# Patient Record
Sex: FEMALE | Race: WHITE | Employment: FULL TIME | URBAN - METROPOLITAN AREA
[De-identification: names, ages, dates, MRNs, and addresses within clinical notes are randomized per-mention and may not be internally consistent; named-entity substitution may affect disease eponyms.]

---

## 2017-07-06 ENCOUNTER — OFFICE VISIT (OUTPATIENT)
Dept: FAMILY MEDICINE CLINIC | Age: 47
End: 2017-07-06

## 2017-07-06 VITALS
SYSTOLIC BLOOD PRESSURE: 114 MMHG | HEART RATE: 84 BPM | DIASTOLIC BLOOD PRESSURE: 70 MMHG | RESPIRATION RATE: 15 BRPM | TEMPERATURE: 97 F | HEIGHT: 66 IN

## 2017-07-06 DIAGNOSIS — S99.912A ANKLE INJURY, LEFT, INITIAL ENCOUNTER: Primary | ICD-10-CM

## 2017-07-06 DIAGNOSIS — S93.412A SPRAIN OF CALCANEOFIBULAR LIGAMENT OF LEFT ANKLE, INITIAL ENCOUNTER: ICD-10-CM

## 2017-07-06 PROCEDURE — G8421 BMI NOT CALCULATED: HCPCS | Performed by: NURSE PRACTITIONER

## 2017-07-06 PROCEDURE — 99213 OFFICE O/P EST LOW 20 MIN: CPT | Performed by: NURSE PRACTITIONER

## 2017-07-06 PROCEDURE — 1036F TOBACCO NON-USER: CPT | Performed by: NURSE PRACTITIONER

## 2017-07-06 PROCEDURE — G8427 DOCREV CUR MEDS BY ELIG CLIN: HCPCS | Performed by: NURSE PRACTITIONER

## 2017-07-06 RX ORDER — IBUPROFEN AND FAMOTIDINE 26.6; 8 MG/1; MG/1
TABLET, FILM COATED ORAL
Qty: 90 TABLET | Refills: 5 | Status: SHIPPED | OUTPATIENT
Start: 2017-07-06 | End: 2017-08-07 | Stop reason: ALTCHOICE

## 2017-08-07 ENCOUNTER — OFFICE VISIT (OUTPATIENT)
Dept: FAMILY MEDICINE CLINIC | Age: 47
End: 2017-08-07

## 2017-08-07 VITALS
HEART RATE: 72 BPM | TEMPERATURE: 97.3 F | HEIGHT: 66 IN | SYSTOLIC BLOOD PRESSURE: 114 MMHG | BODY MASS INDEX: 27.32 KG/M2 | RESPIRATION RATE: 16 BRPM | WEIGHT: 170 LBS | DIASTOLIC BLOOD PRESSURE: 66 MMHG

## 2017-08-07 DIAGNOSIS — R63.5 WEIGHT GAIN: ICD-10-CM

## 2017-08-07 DIAGNOSIS — S93.432D SPRAIN OF TIBIOFIBULAR LIGAMENT OF LEFT ANKLE, SUBSEQUENT ENCOUNTER: ICD-10-CM

## 2017-08-07 DIAGNOSIS — Z12.31 VISIT FOR SCREENING MAMMOGRAM: ICD-10-CM

## 2017-08-07 DIAGNOSIS — M25.572 ACUTE LEFT ANKLE PAIN: ICD-10-CM

## 2017-08-07 DIAGNOSIS — M25.572 ACUTE LEFT ANKLE PAIN: Primary | ICD-10-CM

## 2017-08-07 LAB
ALBUMIN SERPL-MCNC: 4.3 G/DL (ref 3.9–4.9)
ALP BLD-CCNC: 51 U/L (ref 40–130)
ALT SERPL-CCNC: 9 U/L (ref 0–33)
ANION GAP SERPL CALCULATED.3IONS-SCNC: 16 MEQ/L (ref 7–13)
AST SERPL-CCNC: 14 U/L (ref 0–35)
BILIRUB SERPL-MCNC: 0.8 MG/DL (ref 0–1.2)
BUN BLDV-MCNC: 9 MG/DL (ref 6–20)
CALCIUM SERPL-MCNC: 9.2 MG/DL (ref 8.6–10.2)
CHLORIDE BLD-SCNC: 106 MEQ/L (ref 98–107)
CHOLESTEROL, TOTAL: 199 MG/DL (ref 0–199)
CO2: 20 MEQ/L (ref 22–29)
CREAT SERPL-MCNC: 0.65 MG/DL (ref 0.5–0.9)
GFR AFRICAN AMERICAN: >60
GFR NON-AFRICAN AMERICAN: >60
GLOBULIN: 2.7 G/DL (ref 2.3–3.5)
GLUCOSE BLD-MCNC: 89 MG/DL (ref 74–109)
HCT VFR BLD CALC: 33.8 % (ref 37–47)
HDLC SERPL-MCNC: 104 MG/DL (ref 40–59)
HEMOGLOBIN: 10.7 G/DL (ref 12–16)
LDL CHOLESTEROL CALCULATED: 84 MG/DL (ref 0–129)
MCH RBC QN AUTO: 22.1 PG (ref 27–31.3)
MCHC RBC AUTO-ENTMCNC: 31.6 % (ref 33–37)
MCV RBC AUTO: 70 FL (ref 82–100)
PDW BLD-RTO: 18.6 % (ref 11.5–14.5)
PLATELET # BLD: 318 K/UL (ref 130–400)
POTASSIUM SERPL-SCNC: 4.5 MEQ/L (ref 3.5–5.1)
RBC # BLD: 4.83 M/UL (ref 4.2–5.4)
SEDIMENTATION RATE, ERYTHROCYTE: 11 MM (ref 0–20)
SODIUM BLD-SCNC: 142 MEQ/L (ref 132–144)
T4 FREE: 0.91 NG/DL (ref 0.93–1.7)
TOTAL PROTEIN: 7 G/DL (ref 6.4–8.1)
TRIGL SERPL-MCNC: 53 MG/DL (ref 0–200)
TSH SERPL DL<=0.05 MIU/L-ACNC: 4.55 UIU/ML (ref 0.27–4.2)
URIC ACID, SERUM: 3.1 MG/DL (ref 2.4–5.7)
WBC # BLD: 6.5 K/UL (ref 4.8–10.8)

## 2017-08-07 PROCEDURE — G8419 CALC BMI OUT NRM PARAM NOF/U: HCPCS | Performed by: FAMILY MEDICINE

## 2017-08-07 PROCEDURE — 99214 OFFICE O/P EST MOD 30 MIN: CPT | Performed by: FAMILY MEDICINE

## 2017-08-07 PROCEDURE — 1036F TOBACCO NON-USER: CPT | Performed by: FAMILY MEDICINE

## 2017-08-07 PROCEDURE — G8427 DOCREV CUR MEDS BY ELIG CLIN: HCPCS | Performed by: FAMILY MEDICINE

## 2017-08-07 ASSESSMENT — PATIENT HEALTH QUESTIONNAIRE - PHQ9
1. LITTLE INTEREST OR PLEASURE IN DOING THINGS: 0
SUM OF ALL RESPONSES TO PHQ9 QUESTIONS 1 & 2: 0
2. FEELING DOWN, DEPRESSED OR HOPELESS: 0
SUM OF ALL RESPONSES TO PHQ QUESTIONS 1-9: 0

## 2017-08-08 RX ORDER — IRON POLYSACCHARIDE COMPLEX 150 MG
150 CAPSULE ORAL 2 TIMES DAILY
Qty: 60 CAPSULE | Refills: 3 | Status: SHIPPED | OUTPATIENT
Start: 2017-08-08

## 2017-08-08 RX ORDER — LEVOTHYROXINE SODIUM 0.03 MG/1
25 TABLET ORAL DAILY
Qty: 30 TABLET | Refills: 3 | Status: SHIPPED | OUTPATIENT
Start: 2017-08-08 | End: 2017-12-12 | Stop reason: SDUPTHER

## 2017-08-21 ENCOUNTER — HOSPITAL ENCOUNTER (OUTPATIENT)
Dept: WOMENS IMAGING | Age: 47
Discharge: HOME | End: 2017-08-21
Payer: COMMERCIAL

## 2017-08-21 DIAGNOSIS — Z12.31 VISIT FOR SCREENING MAMMOGRAM: ICD-10-CM

## 2017-08-21 PROCEDURE — G0202 SCR MAMMO BI INCL CAD: HCPCS

## 2017-09-13 ENCOUNTER — TELEPHONE (OUTPATIENT)
Dept: FAMILY MEDICINE CLINIC | Age: 47
End: 2017-09-13

## 2017-10-05 DIAGNOSIS — E03.9 HYPOTHYROIDISM, UNSPECIFIED TYPE: Primary | ICD-10-CM

## 2017-10-05 DIAGNOSIS — D64.9 ANEMIA, UNSPECIFIED TYPE: ICD-10-CM

## 2017-10-05 DIAGNOSIS — E03.9 HYPOTHYROIDISM, UNSPECIFIED TYPE: ICD-10-CM

## 2017-10-05 LAB
BASOPHILS ABSOLUTE: 0.1 K/UL (ref 0–0.2)
BASOPHILS RELATIVE PERCENT: 0.8 %
EOSINOPHILS ABSOLUTE: 0 K/UL (ref 0–0.7)
EOSINOPHILS RELATIVE PERCENT: 0.4 %
HCT VFR BLD CALC: 36.4 % (ref 37–47)
HEMOGLOBIN: 11.3 G/DL (ref 12–16)
LYMPHOCYTES ABSOLUTE: 2.4 K/UL (ref 1–4.8)
LYMPHOCYTES RELATIVE PERCENT: 28.7 %
MCH RBC QN AUTO: 23.3 PG (ref 27–31.3)
MCHC RBC AUTO-ENTMCNC: 31.1 % (ref 33–37)
MCV RBC AUTO: 75.1 FL (ref 82–100)
MONOCYTES ABSOLUTE: 0.6 K/UL (ref 0.2–0.8)
MONOCYTES RELATIVE PERCENT: 6.9 %
NEUTROPHILS ABSOLUTE: 5.3 K/UL (ref 1.4–6.5)
NEUTROPHILS RELATIVE PERCENT: 63.2 %
PDW BLD-RTO: 21.4 % (ref 11.5–14.5)
PLATELET # BLD: 300 K/UL (ref 130–400)
RBC # BLD: 4.85 M/UL (ref 4.2–5.4)
T4 FREE: 1 NG/DL (ref 0.93–1.7)
TSH SERPL DL<=0.05 MIU/L-ACNC: 1.99 UIU/ML (ref 0.27–4.2)
WBC # BLD: 8.3 K/UL (ref 4.8–10.8)

## 2017-10-06 ENCOUNTER — HOSPITAL ENCOUNTER (OUTPATIENT)
Dept: PHYSICAL THERAPY | Age: 47
Setting detail: THERAPIES SERIES
Discharge: HOME | End: 2017-10-06
Payer: COMMERCIAL

## 2017-10-06 DIAGNOSIS — D50.9 IRON DEFICIENCY ANEMIA, UNSPECIFIED IRON DEFICIENCY ANEMIA TYPE: Primary | ICD-10-CM

## 2017-10-06 PROCEDURE — 97110 THERAPEUTIC EXERCISES: CPT

## 2017-10-06 PROCEDURE — 97161 PT EVAL LOW COMPLEX 20 MIN: CPT

## 2017-10-06 ASSESSMENT — PAIN DESCRIPTION - FREQUENCY: FREQUENCY: INTERMITTENT

## 2017-10-06 ASSESSMENT — PAIN DESCRIPTION - LOCATION: LOCATION: KNEE

## 2017-10-06 ASSESSMENT — PAIN DESCRIPTION - ORIENTATION: ORIENTATION: LEFT

## 2017-10-06 ASSESSMENT — PAIN DESCRIPTION - DESCRIPTORS: DESCRIPTORS: STABBING;ACHING

## 2017-10-06 ASSESSMENT — PAIN DESCRIPTION - PAIN TYPE: TYPE: ACUTE PAIN

## 2017-10-06 ASSESSMENT — PAIN DESCRIPTION - PROGRESSION: CLINICAL_PROGRESSION: GRADUALLY WORSENING

## 2017-10-06 NOTE — PROGRESS NOTES
down bleachers, since then the pain in the lateral thigh has subsided however she conts to have lateral knee pain that sometimes radiates to the anterior knee; knee has buckled x2 when descending stairs; feels like there is \"an air bubble\" onto lateral knee sometimes when walking ; Pt also reports having a fall onto the L knee >/=20 years ago and having a L lateral ankle sprain in 2017.    Comments: RTD 2 weeks     Objective:   Balance  Single Leg Stance R Le  Single Leg Stance L Le  Comments: increased frontal plane mvmt/instability noted with L SLS     Ambulation 1  Surface: carpet  Device: No Device  Assistance: Independent  Quality of Gait: B medial whip   Distance: in dept     Strength RLE  Comment: 5/5 except hip ext 4/5, hip abd 4+/5   Strength LLE  Comment: 5/5 except hip flex 4+/5, hip abd 4/5, hip ext 4-/5     AROM RLE (degrees)  RLE AROM: WFL  RLE General AROM: knee flex 130, L ankle df with KE 8      AROM LLE (degrees)  LLE AROM : WFL  LLE General AROM: except knee flex 120, L ankle DF with KE 6, with KF 20  (pain at end range flexion )    Observation/Palpation  Posture: Good  Palpation: TTP mid L ITB  Observation: L calcneal valgus      Ambulation 1  Surface: carpet  Device: No Device  Assistance: Independent  Quality of Gait: B medial whip   Distance: in dept      Ambulation  Ambulation?: Yes    Additional Measures  Flexibility: (-) Obers, elys; tight L gastroc see above jorge; 90/90 HS L 32 R 15   Special Tests: (-) varus/valgus stress, anterior/posterior drawer, apleys compression (+) Mcmurrays, patellar compression  Other: able to perform deep squat without pain; increased crepitus with knee arom      Exercises:   Exercises  Exercise 1: HS stretch 30\"x3  (seated and at step)  Exercise 2: gastroc stretch with towel 30\"x3  (towel and at wall )  Exercise 3: S/L hip abd*  Exercise 4: bridges*  Exercise 5: supine hip ext into pball*  Exercise 6: glut med wall press*  Exercise 7: hip Rating (0-10 pain scale): 0/10  Location and pain description same as pre-treatment unless indicated. Action: [x] NA  [] Call Physician  [] Perform HEP  [] Meds as prescribed    Evaluation and patient rights have been reviewed and patient agrees with plan of care. Yes  [x]  No  []   Explain:     Radha Fall Risk Assessment  Risk Factor Scale  Score   History of Falls [] Yes  [] No 25  0 0   Secondary Diagnosis [] Yes  [] No 15  0 0   Ambulatory Aid [] Furniture  [] Crutches/cane/walker  [] None/bedrest/wheelchair/nurse 30  15  0 0   IV/Heparin Lock [] Yes  [] No 20  0 0   Gait/Transferring [] Impaired  [] Weak  [] Normal/bedrest/immobile 20  10  0 0   Mental Status [] Forgets limitations  [] Oriented to own ability 15  0 0       Total: 0     Based on the Assessment score: check the appropriate box. [x]  No intervention needed   Low =   Score of 0-24  []  Use standard prevention interventions Moderate =  Score of 24-44   [] Discuss fall prevention strategies   [] Indicate moderate falls risk on eval  []  Use high risk prevention interventions High = Score of 45 and higher   [] Discuss fall prevention strategies   [] Provide supervision during treatment time    Goals  Long term goals  Time Frame for Long term goals : 4-5 weeks   Long term goal 1: Pt will be indep/compliant with HEP in order to self manage symptoms upon D/C  Long term goal 2: The pt will demo imiproved B hip strength 4+ to 5/5 in order to ambulate with decreased pain/limitations   Long term goal 3: The pt will demo improved/ painfree L knee flexion arom >/=130 in order to perform ADL's with decreased pain   Long term goal 4: The pt will demo HS and gastroc flexibility WFL's in order to decrease L knee pain with stairs   Long term goal 5:  The pt will have an increase in LEFS score >/=10 points demonstrating an improvement in functional activity tolerace    Treatment Initiated : ther ex and hep     PT Individual Minutes  Time In: 0800  Time Out: 8922  Minutes: 44  Timed Code Treatment Minutes: 10 Minutes  Procedure Minutes: 34 minutes  Electronically signed by Armando Vargas PT on 10/6/17 at 9:00 AM

## 2017-10-06 NOTE — PROGRESS NOTES
[address]     Ph: [phone]  Fax: [phone]    [] Certification  [] Recertification [x]  Plan of Care  [] Progress Note [] Discharge      To:  Referring Practitioner: Juliet Crooks       From:  Mis Fung, PT  Patient: Filomena Khan     : 1970  Diagnosis: ITB syndrome, L knee     Date: 10/6/2017  Treatment Diagnosis: L knee pain, decreased L knee arom, B hip strength, L gastroc and HS flexibility, B patella clay, (+) L mcmurrays and apleys compression     Progress Report Period from:  10/6/2017  to 10/6/2017    Total # of Visits to Date: 1   No Show: 0    Canceled Appointment: 0     OBJECTIVE:   Long Term Goals - Time Frame for Long term goals : 4-5 weeks   Goals Current/ Discharge status Met   Long term goal 1: Pt will be indep/compliant with HEP in order to self manage symptoms upon D/C ongoing [] yes  [] no   Long term goal 2: The pt will demo imiproved B hip strength 4+ to 5/5 in order to ambulate with decreased pain/limitations  Strength RLE  Comment: 5/5 except hip ext 4/5, hip abd 4+/5   Strength LLE  Comment: 5/5 except hip flex 4+/5, hip abd 4/5, hip ext 4-/5 [] yes  [] no   Long term goal 3: The pt will demo improved/ painfree L knee flexion arom >/=130 in order to perform ADL's with decreased pain  L knee flexion 120 [] yes  [] no   Long term goal 4: The pt will demo HS and gastroc flexibility WFL's in order to decrease L knee pain with stairs  L HS 90/90 32  L gastroc (df with KE) 6 [] yes  [] no   Long term goal 5:  The pt will have an increase in LEFS score >/=10 points demonstrating an improvement in functional activity tolerace 50/80 [] yes  [] no      Body structures, Functions, Activity limitations: Decreased functional mobility , Decreased ROM, Decreased strength, Decreased balance  Assessment: Pt presents with acute onset of L knee pain x3 weeks with progressive worsening of

## 2017-10-09 ENCOUNTER — HOSPITAL ENCOUNTER (OUTPATIENT)
Dept: PHYSICAL THERAPY | Age: 47
Setting detail: THERAPIES SERIES
Discharge: HOME | End: 2017-10-09
Payer: COMMERCIAL

## 2017-10-09 PROCEDURE — 97110 THERAPEUTIC EXERCISES: CPT

## 2017-10-09 NOTE — PROGRESS NOTES
[address] [address]  Outpatient Physical Therapy    Treatment Note        Date: 10/9/2017  Patient: Rico Denton  : 1970  ACCT #: [de-identified]  Referring Practitioner: Claire Albert   Diagnosis: ITB syndrome, L knee    Visit Information:  PT Visit Information  PT Insurance Information: [employer]   Total # of Visits to Date: 2  No Show: 0  Canceled Appointment: 0  Progress Note Counter: 2/8-10    Subjective: Pt reports having some \"stiffness\" in posterior knee this am upon walking into clinic however denies pain. Compliant with HEP reporting \"feels good after I do the stretches. \"   Comments: RTD 2 weeks   HEP Compliance:  [x] Good [] Fair [] Poor [] Reports not doing due to:    Vital Signs  Patient Currently in Pain: Denies   Pain Screening  Patient Currently in Pain: Denies    OBJECTIVE:   Exercises  Exercise 1: HS stretch 30\"x3  (seated on mat )  Exercise 2: gastroc stretch 30\"x3  (1/2 foam roll )  Exercise 3: S/L hip abd x10 B  Exercise 4: bridges 5\"x10  Exercise 5: supine hip ext into pball 5\"x10   Exercise 6: glut med wall press 5\"x10 B   Exercise 7: hip hikes 6\"x10   Exercise 8: SLR with RTB x10   Exercise 9: 4 way hip with RTB x10 B   Exercise 10: SLS 20\"x3 blue foam   Exercise 11: SLS with reach x10   Exercise 12: mini wall squats 5\"x10   Exercise 13: bosu lunges x10 with RTB around L knee   Exercise 20: HEP: HS stretch, gastroc stretch     Strength: [x] NT  [] MMT completed:    ROM: [x] NT  [] ROM measurements:     Manual:   Manual therapy  Joint mobilization: patellar mobs with end ranges holds medial and lateral x3 min  Soft Tissue Mobalization: foam roller/STM to L ITB x8 min    Modalities:  Modalities  Cryotherapy (Minutes\Location): declined for pain control post exertion      *Indicates exercise, modality, or manual techniques to be initiated when appropriate    Assessment:    Body structures, Functions, Activity limitations: Decreased functional mobility , Decreased ROM, Decreased strength, Decreased balance  Assessment: min c/o increased pain with mini wall squats that decreased with VC's to decrease dynamic valgus as well as during lunges and L SLS during 4 way hip. Otherwise good rafiq to treatment and addition of multiple ex's to increase B hip strength and LE biomechanics. Treatment Diagnosis: L knee pain, decreased L knee arom, B hip strength, L gastroc and HS flexibility, B patella clay, (+) L mcmurrays and apleys compression  Prognosis: Excellent     Goals:  Long term goals  Time Frame for Long term goals : 4-5 weeks   Long term goal 1: Pt will be indep/compliant with HEP in order to self manage symptoms upon D/C  Long term goal 2: The pt will demo imiproved B hip strength 4+ to 5/5 in order to ambulate with decreased pain/limitations   Long term goal 3: The pt will demo improved/ painfree L knee flexion arom >/=130 in order to perform ADL's with decreased pain   Long term goal 4: The pt will demo HS and gastroc flexibility WFL's in order to decrease L knee pain with stairs   Long term goal 5: The pt will have an increase in LEFS score >/=10 points demonstrating an improvement in functional activity tolerace  Progress toward goals:to be determined      POST-PAIN       Pain Rating (0-10 pain scale): no numeric rating provided/10 \"it feels ok\"  Location and pain description same as pre-treatment unless indicated. Action: [x] NA   [] Perform HEP  [] Meds as prescribed  [] Modalities as prescribed   [] Call Physician     Frequency/Duration:  Plan  Times per week: 2  Plan weeks: 4-5  Current Treatment Recommendations: Strengthening, ROM, Manual Therapy - Joint Manipulation, Manual Therapy - Soft Tissue Mobilization, Patient/Caregiver Education & Training, Home Exercise Program, Modalities, Balance Training, Neuromuscular Re-education (modalities to include: MHP/CP, IDN, IFC PRN)  Plan Comment: consider IDN if needed     Pt to continue current HEP.   See objective section for any therapeutic exercise changes, additions or modifications this date.     PT Individual Minutes  Time In: 6211  Time Out: 0800  Minutes: 38  Timed Code Treatment Minutes: 38 Minutes  Procedure Minutes: n/a    Signature:  Electronically signed by John Gonzalez PT on 10/9/17 at 8:01 AM

## 2017-10-12 ENCOUNTER — HOSPITAL ENCOUNTER (OUTPATIENT)
Dept: PHYSICAL THERAPY | Age: 47
Setting detail: THERAPIES SERIES
Discharge: HOME | End: 2017-10-12
Payer: COMMERCIAL

## 2017-10-12 PROCEDURE — 97140 MANUAL THERAPY 1/> REGIONS: CPT

## 2017-10-12 PROCEDURE — G0283 ELEC STIM OTHER THAN WOUND: HCPCS

## 2017-10-12 PROCEDURE — 97110 THERAPEUTIC EXERCISES: CPT

## 2017-10-12 NOTE — PROGRESS NOTES
mild discomfort in post knee post hip hikes, otherwise no pain during tx. Initiated CP/ES for pain control post exertion. Treatment Diagnosis: L knee pain, decreased L knee arom, B hip strength, L gastroc and HS flexibility, B patella clay, (+) L mcmurrays and apleys compression  Prognosis: Excellent     Goals:   Long term goals  Time Frame for Long term goals : 4-5 weeks   Long term goal 1: Pt will be indep/compliant with HEP in order to self manage symptoms upon D/C  Long term goal 2: The pt will demo imiproved B hip strength 4+ to 5/5 in order to ambulate with decreased pain/limitations   Long term goal 3: The pt will demo improved/ painfree L knee flexion arom >/=130 in order to perform ADL's with decreased pain   Long term goal 4: The pt will demo HS and gastroc flexibility WFL's in order to decrease L knee pain with stairs   Long term goal 5: The pt will have an increase in LEFS score >/=10 points demonstrating an improvement in functional activity tolerace  Progress toward goals: Cont progressing exs as able. Cont to monitor for proper body mechanics w/ exs/ gait. POST-PAIN       Pain Rating (0-10 pain scale):   0/10   Location and pain description same as pre-treatment unless indicated. Action: [] NA   [x] Perform HEP  [] Meds as prescribed  [x] Modalities as prescribed   [] Call Physician     Frequency/Duration:  Plan  Times per week: 2  Plan weeks: 4-5  Current Treatment Recommendations: Strengthening, ROM, Manual Therapy - Joint Manipulation, Manual Therapy - Soft Tissue Mobilization, Patient/Caregiver Education & Training, Home Exercise Program, Modalities, Balance Training, Neuromuscular Re-education (modalities to include: MHP/CP, IDN, IFC PRN)  Plan Comment: consider IDN if needed     Pt to continue current HEP. See objective section for any therapeutic exercise changes, additions or modifications this date.      PT Individual Minutes  Time In: 2614  Time Out: 8388  Minutes: 48  Timed Code Treatment Minutes: 38 Minutes  Procedure Minutes: 10 min     Signature:  Electronically signed by Jeanie Valenzuela PTA on 10/12/17 at 8:07 AM

## 2017-10-16 ENCOUNTER — HOSPITAL ENCOUNTER (OUTPATIENT)
Dept: PHYSICAL THERAPY | Age: 47
Setting detail: THERAPIES SERIES
Discharge: HOME | End: 2017-10-16
Payer: COMMERCIAL

## 2017-10-16 PROCEDURE — G0283 ELEC STIM OTHER THAN WOUND: HCPCS

## 2017-10-16 PROCEDURE — 97110 THERAPEUTIC EXERCISES: CPT

## 2017-10-18 ENCOUNTER — HOSPITAL ENCOUNTER (OUTPATIENT)
Dept: PHYSICAL THERAPY | Age: 47
Setting detail: THERAPIES SERIES
Discharge: HOME | End: 2017-10-18
Payer: COMMERCIAL

## 2017-10-18 PROCEDURE — 97110 THERAPEUTIC EXERCISES: CPT

## 2017-10-24 ENCOUNTER — HOSPITAL ENCOUNTER (OUTPATIENT)
Dept: PHYSICAL THERAPY | Age: 47
Setting detail: THERAPIES SERIES
Discharge: HOME | End: 2017-10-24
Payer: COMMERCIAL

## 2017-10-24 PROCEDURE — 97110 THERAPEUTIC EXERCISES: CPT

## 2017-10-24 NOTE — PROGRESS NOTES
pain, decreased L knee arom, B hip strength, L gastroc and HS flexibility, B patella clay, (+) L mcmurrays and apleys compression  Prognosis: Excellent     Goals:   Long term goals  Time Frame for Long term goals : 4-5 weeks   Long term goal 1: Pt will be indep/compliant with HEP in order to self manage symptoms upon D/C  Long term goal 2: The pt will demo imiproved B hip strength 4+ to 5/5 in order to ambulate with decreased pain/limitations   Long term goal 3: The pt will demo improved/ painfree L knee flexion arom >/=130 in order to perform ADL's with decreased pain   Long term goal 4: The pt will demo HS and gastroc flexibility WFL's in order to decrease L knee pain with stairs   Long term goal 5: The pt will have an increase in LEFS score >/=10 points demonstrating an improvement in functional activity tolerace  Progress toward goals: Cont progressing exs as able. POST-PAIN       Pain Rating (0-10 pain scale):   3/10   Location and pain description same as pre-treatment unless indicated. Action: [] NA   [x] Perform HEP  [] Meds as prescribed  [] Modalities as prescribed   [] Call Physician     Frequency/Duration:  Plan  Times per week: 2  Plan weeks: 4-5  Current Treatment Recommendations: Strengthening, ROM, Manual Therapy - Joint Manipulation, Manual Therapy - Soft Tissue Mobilization, Patient/Caregiver Education & Training, Home Exercise Program, Modalities, Balance Training, Neuromuscular Re-education (modalities to include: MHP/CP, IDN, IFC PRN)  Plan Comment: consider IDN if needed     Pt to continue current HEP. See objective section for any therapeutic exercise changes, additions or modifications this date.     PT Individual Minutes  Time In: 7287  Time Out: 1903  Minutes: 48  Timed Code Treatment Minutes: 38 Minutes  Procedure Minutes: 10 min     Signature:  Electronically signed by Jeanie Valenzuela PTA on 10/24/17 at 5:22 PM

## 2017-10-26 ENCOUNTER — HOSPITAL ENCOUNTER (OUTPATIENT)
Dept: PHYSICAL THERAPY | Age: 47
Setting detail: THERAPIES SERIES
Discharge: HOME | End: 2017-10-26
Payer: COMMERCIAL

## 2017-10-26 PROCEDURE — 97110 THERAPEUTIC EXERCISES: CPT

## 2017-10-26 ASSESSMENT — PAIN DESCRIPTION - FREQUENCY: FREQUENCY: INTERMITTENT

## 2017-10-26 ASSESSMENT — PAIN DESCRIPTION - ORIENTATION: ORIENTATION: LEFT

## 2017-10-26 ASSESSMENT — PAIN DESCRIPTION - PAIN TYPE: TYPE: ACUTE PAIN

## 2017-10-26 ASSESSMENT — PAIN DESCRIPTION - DESCRIPTORS: DESCRIPTORS: ACHING

## 2017-10-26 ASSESSMENT — PAIN DESCRIPTION - LOCATION: LOCATION: KNEE

## 2017-10-26 NOTE — PROGRESS NOTES
strength, Decreased balance  Assessment: Added Pball wall squats w/ holds, lateral BOSU lunges and SLS on foam at rebounder to cont challenging pts SL bal/ stability and strength. Good rafiq w/ no increase in pain. Improved ability to self recover from LOB w/o external support noted. Treatment Diagnosis: L knee pain, decreased L knee arom, B hip strength, L gastroc and HS flexibility, B patella clay, (+) L mcmurrays and apleys compression  Prognosis: Excellent     Goals:  Long term goals  Time Frame for Long term goals : 4-5 weeks   Long term goal 1: Pt will be indep/compliant with HEP in order to self manage symptoms upon D/C  Long term goal 2: The pt will demo imiproved B hip strength 4+ to 5/5 in order to ambulate with decreased pain/limitations   Long term goal 3: The pt will demo improved/ painfree L knee flexion arom >/=130 in order to perform ADL's with decreased pain   Long term goal 4: The pt will demo HS and gastroc flexibility WFL's in order to decrease L knee pain with stairs   Long term goal 5: The pt will have an increase in LEFS score >/=10 points demonstrating an improvement in functional activity tolerace  Progress toward goals: SL bal, strength    POST-PAIN       Pain Rating (0-10 pain scale):  0 /10   Location and pain description same as pre-treatment unless indicated. Action: [] NA   [] Perform HEP  [] Meds as prescribed  [] Modalities as prescribed   [] Call Physician     Frequency/Duration:  Plan  Times per week: 2  Plan weeks: 4-5  Current Treatment Recommendations: Strengthening, ROM, Manual Therapy - Joint Manipulation, Manual Therapy - Soft Tissue Mobilization, Patient/Caregiver Education & Training, Home Exercise Program, Modalities, Balance Training, Neuromuscular Re-education (modalities to include: MHP/CP, IDN, IFC PRN)  Plan Comment: consider IDN if needed     Pt to continue current HEP.   See objective section for any therapeutic exercise changes, additions or modifications this

## 2017-10-30 ENCOUNTER — HOSPITAL ENCOUNTER (OUTPATIENT)
Dept: PHYSICAL THERAPY | Age: 47
Setting detail: THERAPIES SERIES
Discharge: HOME | End: 2017-10-30
Payer: COMMERCIAL

## 2017-10-30 PROCEDURE — 97110 THERAPEUTIC EXERCISES: CPT

## 2017-12-12 RX ORDER — LEVOTHYROXINE SODIUM 0.03 MG/1
25 TABLET ORAL DAILY
Qty: 30 TABLET | Refills: 3 | Status: SHIPPED | OUTPATIENT
Start: 2017-12-12 | End: 2018-02-26 | Stop reason: SDUPTHER

## 2018-01-26 ENCOUNTER — OFFICE VISIT (OUTPATIENT)
Dept: FAMILY MEDICINE CLINIC | Age: 48
End: 2018-01-26
Payer: COMMERCIAL

## 2018-01-26 VITALS
WEIGHT: 166 LBS | RESPIRATION RATE: 18 BRPM | TEMPERATURE: 97.6 F | HEIGHT: 66 IN | BODY MASS INDEX: 26.68 KG/M2 | SYSTOLIC BLOOD PRESSURE: 118 MMHG | HEART RATE: 98 BPM | DIASTOLIC BLOOD PRESSURE: 68 MMHG | OXYGEN SATURATION: 97 %

## 2018-01-26 DIAGNOSIS — J32.4 PANSINUSITIS, UNSPECIFIED CHRONICITY: ICD-10-CM

## 2018-01-26 DIAGNOSIS — R05.9 COUGH: Primary | ICD-10-CM

## 2018-01-26 PROCEDURE — 1036F TOBACCO NON-USER: CPT | Performed by: NURSE PRACTITIONER

## 2018-01-26 PROCEDURE — 99213 OFFICE O/P EST LOW 20 MIN: CPT | Performed by: NURSE PRACTITIONER

## 2018-01-26 PROCEDURE — G8417 CALC BMI ABV UP PARAM F/U: HCPCS | Performed by: NURSE PRACTITIONER

## 2018-01-26 PROCEDURE — G8427 DOCREV CUR MEDS BY ELIG CLIN: HCPCS | Performed by: NURSE PRACTITIONER

## 2018-01-26 PROCEDURE — G8484 FLU IMMUNIZE NO ADMIN: HCPCS | Performed by: NURSE PRACTITIONER

## 2018-01-26 RX ORDER — DOXYCYCLINE HYCLATE 100 MG
100 TABLET ORAL 2 TIMES DAILY
Qty: 20 TABLET | Refills: 0 | Status: SHIPPED | OUTPATIENT
Start: 2018-01-26 | End: 2018-02-05

## 2018-01-26 RX ORDER — DEXTROMETHORPHAN HYDROBROMIDE AND PROMETHAZINE HYDROCHLORIDE 15; 6.25 MG/5ML; MG/5ML
5 SYRUP ORAL 4 TIMES DAILY PRN
Qty: 118 ML | Refills: 0 | Status: SHIPPED | OUTPATIENT
Start: 2018-01-26 | End: 2018-02-02

## 2018-01-26 RX ORDER — BENZONATATE 100 MG/1
100 CAPSULE ORAL 3 TIMES DAILY PRN
Qty: 21 CAPSULE | Refills: 0 | Status: SHIPPED | OUTPATIENT
Start: 2018-01-26 | End: 2018-02-02

## 2018-01-26 ASSESSMENT — ENCOUNTER SYMPTOMS
COUGH: 1
WHEEZING: 0

## 2018-01-26 NOTE — PROGRESS NOTES
Subjective:      Patient ID: Macy Vernon is a 52 y.o. female who presents today for:  Chief Complaint   Patient presents with    Cough     x 1 wk-bringing up yellow phlegm     Chest Congestion       URI    This is a new problem. Episode onset: approx 2 weeks. The problem has been gradually worsening. There has been no fever. Associated symptoms include congestion, coughing and rhinorrhea. Pertinent negatives include no abdominal pain, chest pain, diarrhea, ear pain, headaches, nausea, rash, sore throat, vomiting or wheezing. Treatments tried: delsum. The treatment provided no relief. No past medical history on file. Current Outpatient Prescriptions on File Prior to Visit   Medication Sig Dispense Refill    levothyroxine (SYNTHROID) 25 MCG tablet Take 1 tablet by mouth Daily 30 tablet 3    iron polysaccharides (NU-IRON) 150 MG capsule Take 1 capsule by mouth 2 times daily 60 capsule 3     No current facility-administered medications on file prior to visit. Past Surgical History:   Procedure Laterality Date     SECTION      x 2     Family History   Problem Relation Age of Onset    Heart Disease Father      late 46s     Social History     Social History    Marital status:      Spouse name: N/A    Number of children: N/A    Years of education: N/A     Occupational History    Not on file. Social History Main Topics    Smoking status: Never Smoker    Smokeless tobacco: Never Used    Alcohol use Yes      Comment: rare    Drug use: Unknown    Sexual activity: Not on file     Other Topics Concern    Not on file     Social History Narrative    No narrative on file     Allergies:  Review of patient's allergies indicates no known allergies. Review of Systems   Constitutional: Negative for chills, fatigue and fever. HENT: Positive for congestion, postnasal drip, rhinorrhea and sinus pressure. Negative for ear pain, facial swelling, sore throat and trouble swallowing.

## 2018-01-29 ASSESSMENT — ENCOUNTER SYMPTOMS
SINUS PRESSURE: 1
ABDOMINAL PAIN: 0
NAUSEA: 0
FACIAL SWELLING: 0
TROUBLE SWALLOWING: 0
RHINORRHEA: 1
EYE DISCHARGE: 0
SORE THROAT: 0
DIARRHEA: 0
EYE PAIN: 0
VOMITING: 0
CHEST TIGHTNESS: 0
SHORTNESS OF BREATH: 0

## 2018-02-26 RX ORDER — LEVOTHYROXINE SODIUM 0.03 MG/1
25 TABLET ORAL DAILY
Qty: 90 TABLET | Refills: 1 | Status: SHIPPED | OUTPATIENT
Start: 2018-02-26 | End: 2018-10-05

## 2018-03-06 ENCOUNTER — OFFICE VISIT (OUTPATIENT)
Dept: FAMILY MEDICINE CLINIC | Age: 48
End: 2018-03-06
Payer: COMMERCIAL

## 2018-03-06 VITALS
DIASTOLIC BLOOD PRESSURE: 68 MMHG | BODY MASS INDEX: 26.52 KG/M2 | RESPIRATION RATE: 18 BRPM | OXYGEN SATURATION: 97 % | HEIGHT: 66 IN | SYSTOLIC BLOOD PRESSURE: 128 MMHG | TEMPERATURE: 98.1 F | WEIGHT: 165 LBS | HEART RATE: 74 BPM

## 2018-03-06 DIAGNOSIS — R30.0 DYSURIA: Primary | ICD-10-CM

## 2018-03-06 DIAGNOSIS — J40 BRONCHITIS: ICD-10-CM

## 2018-03-06 DIAGNOSIS — B96.89 ACUTE BACTERIAL SINUSITIS: ICD-10-CM

## 2018-03-06 DIAGNOSIS — J01.90 ACUTE BACTERIAL SINUSITIS: ICD-10-CM

## 2018-03-06 LAB
BILIRUBIN, POC: ABNORMAL
BLOOD URINE, POC: POSITIVE
CLARITY, POC: ABNORMAL
COLOR, POC: ABNORMAL
GLUCOSE URINE, POC: ABNORMAL
KETONES, POC: ABNORMAL
LEUKOCYTE EST, POC: POSITIVE
NITRITE, POC: ABNORMAL
PH, POC: 6.5
PROTEIN, POC: POSITIVE
SPECIFIC GRAVITY, POC: 1.01
UROBILINOGEN, POC: ABNORMAL

## 2018-03-06 PROCEDURE — G8427 DOCREV CUR MEDS BY ELIG CLIN: HCPCS | Performed by: FAMILY MEDICINE

## 2018-03-06 PROCEDURE — G8484 FLU IMMUNIZE NO ADMIN: HCPCS | Performed by: FAMILY MEDICINE

## 2018-03-06 PROCEDURE — G8417 CALC BMI ABV UP PARAM F/U: HCPCS | Performed by: FAMILY MEDICINE

## 2018-03-06 PROCEDURE — 1036F TOBACCO NON-USER: CPT | Performed by: FAMILY MEDICINE

## 2018-03-06 PROCEDURE — 99213 OFFICE O/P EST LOW 20 MIN: CPT | Performed by: FAMILY MEDICINE

## 2018-03-06 PROCEDURE — 81003 URINALYSIS AUTO W/O SCOPE: CPT | Performed by: FAMILY MEDICINE

## 2018-03-06 RX ORDER — LEVOFLOXACIN 500 MG/1
500 TABLET, FILM COATED ORAL DAILY
Qty: 10 TABLET | Refills: 0 | Status: SHIPPED | OUTPATIENT
Start: 2018-03-06 | End: 2018-03-16

## 2018-03-06 ASSESSMENT — ENCOUNTER SYMPTOMS
EYES NEGATIVE: 1
CHEST TIGHTNESS: 0
RHINORRHEA: 1
SORE THROAT: 1
GASTROINTESTINAL NEGATIVE: 1
COUGH: 1

## 2018-03-07 NOTE — PROGRESS NOTES
File Prior to Visit   Medication Sig Dispense Refill    levothyroxine (SYNTHROID) 25 MCG tablet Take 1 tablet by mouth Daily 90 tablet 1    iron polysaccharides (NU-IRON) 150 MG capsule Take 1 capsule by mouth 2 times daily 60 capsule 3     No current facility-administered medications on file prior to visit. No Known Allergies  Health Maintenance   Topic Date Due    HIV screen  09/11/1985    DTaP/Tdap/Td vaccine (1 - Tdap) 09/11/1989    Cervical cancer screen  07/12/2015    Flu vaccine (1) 09/01/2017    Lipid screen  08/07/2022       Review of Systems    Review of Systems   Constitutional: Negative for activity change, appetite change, chills, fatigue and fever. HENT: Positive for rhinorrhea and sore throat. Negative for congestion. Eyes: Negative. Respiratory: Positive for cough. Negative for chest tightness. Cardiovascular: Negative. Gastrointestinal: Negative. Endocrine: Negative. Genitourinary: Negative. Negative for flank pain. Musculoskeletal: Negative. Skin: Negative. Neurological: Negative for dizziness, light-headedness and numbness. Hematological: Negative. Psychiatric/Behavioral: Negative. Physical Exam  Vitals:    03/06/18 1942   BP: 128/68   Pulse: 74   Resp: 18   Temp: 98.1 °F (36.7 °C)   TempSrc: Tympanic   SpO2: 97%   Weight: 165 lb (74.8 kg)   Height: 5' 6\" (1.676 m)       Physical Exam   Constitutional: She appears well-developed and well-nourished. HENT:   Right Ear: External ear normal.   Left Ear: External ear normal.   Eyes: Conjunctivae are normal. Pupils are equal, round, and reactive to light. Neck: Normal range of motion. Neck supple. No thyromegaly present. Cardiovascular: Normal rate, regular rhythm and normal heart sounds. No murmur heard. Pulmonary/Chest: Effort normal and breath sounds normal.   Abdominal: Soft. Bowel sounds are normal. She exhibits no distension. There is no tenderness.    Musculoskeletal: Normal range of

## 2018-03-09 LAB
ORGANISM: ABNORMAL
URINE CULTURE, ROUTINE: ABNORMAL
URINE CULTURE, ROUTINE: ABNORMAL

## 2018-04-23 RX ORDER — LEVOTHYROXINE SODIUM 0.03 MG/1
25 TABLET ORAL DAILY
Qty: 30 TABLET | Refills: 1 | Status: SHIPPED | OUTPATIENT
Start: 2018-04-23 | End: 2018-07-16 | Stop reason: SDUPTHER

## 2018-05-07 ENCOUNTER — OFFICE VISIT (OUTPATIENT)
Dept: FAMILY MEDICINE CLINIC | Age: 48
End: 2018-05-07
Payer: COMMERCIAL

## 2018-05-07 VITALS
WEIGHT: 164 LBS | SYSTOLIC BLOOD PRESSURE: 110 MMHG | OXYGEN SATURATION: 98 % | DIASTOLIC BLOOD PRESSURE: 50 MMHG | HEART RATE: 90 BPM | TEMPERATURE: 99 F | RESPIRATION RATE: 20 BRPM | HEIGHT: 66 IN | BODY MASS INDEX: 26.36 KG/M2

## 2018-05-07 DIAGNOSIS — R50.9 FEVER, UNSPECIFIED FEVER CAUSE: ICD-10-CM

## 2018-05-07 DIAGNOSIS — J02.9 ACUTE PHARYNGITIS, UNSPECIFIED ETIOLOGY: Primary | ICD-10-CM

## 2018-05-07 DIAGNOSIS — J02.9 ACUTE PHARYNGITIS, UNSPECIFIED ETIOLOGY: ICD-10-CM

## 2018-05-07 LAB
INFLUENZA A ANTIBODY: NORMAL
INFLUENZA B ANTIBODY: NORMAL
S PYO AG THROAT QL: NORMAL

## 2018-05-07 PROCEDURE — 99213 OFFICE O/P EST LOW 20 MIN: CPT | Performed by: INTERNAL MEDICINE

## 2018-05-07 PROCEDURE — 87804 INFLUENZA ASSAY W/OPTIC: CPT | Performed by: INTERNAL MEDICINE

## 2018-05-07 PROCEDURE — G8427 DOCREV CUR MEDS BY ELIG CLIN: HCPCS | Performed by: INTERNAL MEDICINE

## 2018-05-07 PROCEDURE — G8417 CALC BMI ABV UP PARAM F/U: HCPCS | Performed by: INTERNAL MEDICINE

## 2018-05-07 PROCEDURE — 87880 STREP A ASSAY W/OPTIC: CPT | Performed by: INTERNAL MEDICINE

## 2018-05-07 PROCEDURE — 1036F TOBACCO NON-USER: CPT | Performed by: INTERNAL MEDICINE

## 2018-05-07 ASSESSMENT — ENCOUNTER SYMPTOMS
SHORTNESS OF BREATH: 0
BACK PAIN: 0
EYE PAIN: 0
ABDOMINAL PAIN: 0

## 2018-05-08 RX ORDER — AMOXICILLIN 875 MG/1
875 TABLET, COATED ORAL 2 TIMES DAILY
Qty: 20 TABLET | Refills: 0 | Status: SHIPPED | OUTPATIENT
Start: 2018-05-08 | End: 2018-05-18

## 2018-05-10 LAB — THROAT CULTURE: NORMAL

## 2018-07-16 RX ORDER — LEVOTHYROXINE SODIUM 0.03 MG/1
25 TABLET ORAL DAILY
Qty: 30 TABLET | Refills: 1 | Status: SHIPPED | OUTPATIENT
Start: 2018-07-16 | End: 2018-09-10 | Stop reason: SDUPTHER

## 2018-09-10 RX ORDER — LEVOTHYROXINE SODIUM 0.03 MG/1
25 TABLET ORAL DAILY
Qty: 30 TABLET | Refills: 1 | Status: SHIPPED | OUTPATIENT
Start: 2018-09-10 | End: 2018-10-05 | Stop reason: SDUPTHER

## 2018-10-05 ENCOUNTER — TELEPHONE (OUTPATIENT)
Dept: FAMILY MEDICINE CLINIC | Age: 48
End: 2018-10-05

## 2018-10-05 RX ORDER — LEVOTHYROXINE SODIUM 0.03 MG/1
25 TABLET ORAL DAILY
Qty: 30 TABLET | Refills: 0 | Status: SHIPPED | OUTPATIENT
Start: 2018-10-05 | End: 2018-10-09 | Stop reason: SDUPTHER

## 2018-10-09 RX ORDER — LEVOTHYROXINE SODIUM 0.03 MG/1
25 TABLET ORAL DAILY
Qty: 30 TABLET | Refills: 0 | Status: SHIPPED | OUTPATIENT
Start: 2018-10-09 | End: 2018-12-17 | Stop reason: SDUPTHER

## 2018-10-30 ENCOUNTER — OFFICE VISIT (OUTPATIENT)
Dept: FAMILY MEDICINE CLINIC | Age: 48
End: 2018-10-30
Payer: COMMERCIAL

## 2018-10-30 VITALS
DIASTOLIC BLOOD PRESSURE: 80 MMHG | WEIGHT: 156 LBS | HEART RATE: 86 BPM | BODY MASS INDEX: 25.07 KG/M2 | OXYGEN SATURATION: 98 % | SYSTOLIC BLOOD PRESSURE: 114 MMHG | RESPIRATION RATE: 16 BRPM | TEMPERATURE: 98.9 F | HEIGHT: 66 IN

## 2018-10-30 DIAGNOSIS — J02.0 ACUTE STREPTOCOCCAL PHARYNGITIS: ICD-10-CM

## 2018-10-30 DIAGNOSIS — J01.90 ACUTE NON-RECURRENT SINUSITIS, UNSPECIFIED LOCATION: Primary | ICD-10-CM

## 2018-10-30 LAB — S PYO AG THROAT QL: ABNORMAL

## 2018-10-30 PROCEDURE — 87880 STREP A ASSAY W/OPTIC: CPT | Performed by: FAMILY MEDICINE

## 2018-10-30 PROCEDURE — 99213 OFFICE O/P EST LOW 20 MIN: CPT | Performed by: FAMILY MEDICINE

## 2018-10-30 PROCEDURE — 1036F TOBACCO NON-USER: CPT | Performed by: FAMILY MEDICINE

## 2018-10-30 PROCEDURE — G8484 FLU IMMUNIZE NO ADMIN: HCPCS | Performed by: FAMILY MEDICINE

## 2018-10-30 PROCEDURE — G8417 CALC BMI ABV UP PARAM F/U: HCPCS | Performed by: FAMILY MEDICINE

## 2018-10-30 PROCEDURE — G8427 DOCREV CUR MEDS BY ELIG CLIN: HCPCS | Performed by: FAMILY MEDICINE

## 2018-10-30 RX ORDER — CLARITHROMYCIN 500 MG/1
500 TABLET, COATED ORAL 2 TIMES DAILY
Qty: 20 TABLET | Refills: 0 | Status: SHIPPED | OUTPATIENT
Start: 2018-10-30 | End: 2018-11-09

## 2018-10-30 ASSESSMENT — ENCOUNTER SYMPTOMS
SINUS PRESSURE: 1
TROUBLE SWALLOWING: 1
COUGH: 1
NAUSEA: 0
SORE THROAT: 1
SINUS PAIN: 1
SWOLLEN GLANDS: 1
GASTROINTESTINAL NEGATIVE: 1
VISUAL CHANGE: 0
VOICE CHANGE: 1
CHANGE IN BOWEL HABIT: 0
RHINORRHEA: 1
VOMITING: 0
ABDOMINAL PAIN: 0

## 2018-10-30 ASSESSMENT — PATIENT HEALTH QUESTIONNAIRE - PHQ9
2. FEELING DOWN, DEPRESSED OR HOPELESS: 0
SUM OF ALL RESPONSES TO PHQ9 QUESTIONS 1 & 2: 0
DEPRESSION UNABLE TO ASSESS: FUNCTIONAL CAPACITY MOTIVATION LIMITS ACCURACY
SUM OF ALL RESPONSES TO PHQ QUESTIONS 1-9: 0
SUM OF ALL RESPONSES TO PHQ QUESTIONS 1-9: 0
1. LITTLE INTEREST OR PLEASURE IN DOING THINGS: 0

## 2018-10-30 NOTE — PROGRESS NOTES
swelling, myalgias and neck pain. Skin: Negative. Negative for rash. Neurological: Positive for headaches. Negative for vertigo, weakness and numbness. Hematological: Negative. Psychiatric/Behavioral: Negative. No past medical history on file. Past Surgical History:   Procedure Laterality Date     SECTION      x 2     Social History     Social History    Marital status:      Spouse name: N/A    Number of children: N/A    Years of education: N/A     Occupational History    Not on file. Social History Main Topics    Smoking status: Never Smoker    Smokeless tobacco: Never Used    Alcohol use Yes      Comment: rare    Drug use: Unknown    Sexual activity: Not on file     Other Topics Concern    Not on file     Social History Narrative    No narrative on file     Family History   Problem Relation Age of Onset    Heart Disease Father         late 46s     Allergies   Allergen Reactions    Levofloxacin Nausea Only and Other (See Comments)     Felt \"spacy\"     Current Outpatient Prescriptions on File Prior to Visit   Medication Sig Dispense Refill    levothyroxine (SYNTHROID) 25 MCG tablet Take 1 tablet by mouth Daily 30 tablet 0    iron polysaccharides (NU-IRON) 150 MG capsule Take 1 capsule by mouth 2 times daily 60 capsule 3     No current facility-administered medications on file prior to visit. Objective    Vitals:    10/30/18 1119   BP: 114/80   Pulse: 86   Resp: 16   Temp: 98.9 °F (37.2 °C)   TempSrc: Tympanic   SpO2: 98%   Weight: 156 lb (70.8 kg)   Height: 5' 6\" (1.676 m)     Physical Exam   Constitutional: She is oriented to person, place, and time. She appears well-developed and well-nourished. No distress. HENT:   Head: Normocephalic and atraumatic. Right Ear: Tympanic membrane is not perforated, not erythematous, not retracted and not bulging. A middle ear effusion is present.    Left Ear: Tympanic membrane is not perforated, not erythematous, not

## 2018-11-05 ENCOUNTER — OFFICE VISIT (OUTPATIENT)
Dept: FAMILY MEDICINE CLINIC | Age: 48
End: 2018-11-05
Payer: COMMERCIAL

## 2018-11-05 VITALS
TEMPERATURE: 97.8 F | RESPIRATION RATE: 16 BRPM | SYSTOLIC BLOOD PRESSURE: 104 MMHG | BODY MASS INDEX: 25.71 KG/M2 | HEART RATE: 68 BPM | DIASTOLIC BLOOD PRESSURE: 60 MMHG | HEIGHT: 66 IN | WEIGHT: 160 LBS

## 2018-11-05 DIAGNOSIS — Z12.31 ENCOUNTER FOR SCREENING MAMMOGRAM FOR BREAST CANCER: ICD-10-CM

## 2018-11-05 DIAGNOSIS — E03.9 HYPOTHYROIDISM, UNSPECIFIED TYPE: Primary | ICD-10-CM

## 2018-11-05 DIAGNOSIS — E01.0 THYROMEGALY: ICD-10-CM

## 2018-11-05 DIAGNOSIS — D64.9 ANEMIA, UNSPECIFIED TYPE: ICD-10-CM

## 2018-11-05 PROCEDURE — G8484 FLU IMMUNIZE NO ADMIN: HCPCS | Performed by: FAMILY MEDICINE

## 2018-11-05 PROCEDURE — G8417 CALC BMI ABV UP PARAM F/U: HCPCS | Performed by: FAMILY MEDICINE

## 2018-11-05 PROCEDURE — G8427 DOCREV CUR MEDS BY ELIG CLIN: HCPCS | Performed by: FAMILY MEDICINE

## 2018-11-05 PROCEDURE — 99214 OFFICE O/P EST MOD 30 MIN: CPT | Performed by: FAMILY MEDICINE

## 2018-11-05 PROCEDURE — 1036F TOBACCO NON-USER: CPT | Performed by: FAMILY MEDICINE

## 2018-11-06 DIAGNOSIS — D64.9 ANEMIA, UNSPECIFIED TYPE: ICD-10-CM

## 2018-11-06 DIAGNOSIS — E03.9 HYPOTHYROIDISM, UNSPECIFIED TYPE: ICD-10-CM

## 2018-11-06 LAB
ALBUMIN SERPL-MCNC: 4.3 G/DL (ref 3.9–4.9)
ALP BLD-CCNC: 54 U/L (ref 40–130)
ALT SERPL-CCNC: 11 U/L (ref 0–33)
ANION GAP SERPL CALCULATED.3IONS-SCNC: 13 MEQ/L (ref 7–13)
AST SERPL-CCNC: 16 U/L (ref 0–35)
BILIRUB SERPL-MCNC: 0.5 MG/DL (ref 0–1.2)
BUN BLDV-MCNC: 13 MG/DL (ref 6–20)
CALCIUM SERPL-MCNC: 9.1 MG/DL (ref 8.6–10.2)
CHLORIDE BLD-SCNC: 102 MEQ/L (ref 98–107)
CHOLESTEROL, TOTAL: 196 MG/DL (ref 0–199)
CO2: 24 MEQ/L (ref 22–29)
CREAT SERPL-MCNC: 0.69 MG/DL (ref 0.5–0.9)
GFR AFRICAN AMERICAN: >60
GFR NON-AFRICAN AMERICAN: >60
GLOBULIN: 2.9 G/DL (ref 2.3–3.5)
GLUCOSE BLD-MCNC: 89 MG/DL (ref 74–109)
HCT VFR BLD CALC: 38.3 % (ref 37–47)
HDLC SERPL-MCNC: 93 MG/DL (ref 40–59)
HEMOGLOBIN: 12.8 G/DL (ref 12–16)
LDL CHOLESTEROL CALCULATED: 96 MG/DL (ref 0–129)
MCH RBC QN AUTO: 25.9 PG (ref 27–31.3)
MCHC RBC AUTO-ENTMCNC: 33.5 % (ref 33–37)
MCV RBC AUTO: 77.5 FL (ref 82–100)
PDW BLD-RTO: 15.5 % (ref 11.5–14.5)
PLATELET # BLD: 359 K/UL (ref 130–400)
POTASSIUM SERPL-SCNC: 4.2 MEQ/L (ref 3.5–5.1)
RBC # BLD: 4.94 M/UL (ref 4.2–5.4)
SODIUM BLD-SCNC: 139 MEQ/L (ref 132–144)
T4 FREE: 1.03 NG/DL (ref 0.93–1.7)
TOTAL PROTEIN: 7.2 G/DL (ref 6.4–8.1)
TRIGL SERPL-MCNC: 35 MG/DL (ref 0–200)
TSH SERPL DL<=0.05 MIU/L-ACNC: 3 UIU/ML (ref 0.27–4.2)
WBC # BLD: 5.6 K/UL (ref 4.8–10.8)

## 2018-11-14 ENCOUNTER — HOSPITAL ENCOUNTER (OUTPATIENT)
Dept: WOMENS IMAGING | Age: 48
Discharge: HOME | End: 2018-11-16
Payer: COMMERCIAL

## 2018-11-14 ENCOUNTER — HOSPITAL ENCOUNTER (OUTPATIENT)
Dept: ULTRASOUND IMAGING | Age: 48
Discharge: HOME | End: 2018-11-16
Payer: COMMERCIAL

## 2018-11-14 DIAGNOSIS — E01.0 THYROMEGALY: ICD-10-CM

## 2018-11-14 DIAGNOSIS — Z12.31 ENCOUNTER FOR SCREENING MAMMOGRAM FOR BREAST CANCER: ICD-10-CM

## 2018-11-14 PROCEDURE — 77067 SCR MAMMO BI INCL CAD: CPT

## 2018-11-14 PROCEDURE — 76536 US EXAM OF HEAD AND NECK: CPT

## 2018-11-15 DIAGNOSIS — R92.8 ABNORMAL MAMMOGRAM: Primary | ICD-10-CM

## 2018-11-19 ENCOUNTER — HOSPITAL ENCOUNTER (OUTPATIENT)
Dept: ULTRASOUND IMAGING | Age: 48
Discharge: HOME | End: 2018-11-21
Payer: COMMERCIAL

## 2018-11-19 ENCOUNTER — HOSPITAL ENCOUNTER (OUTPATIENT)
Dept: WOMENS IMAGING | Age: 48
Discharge: HOME | End: 2018-11-21
Payer: COMMERCIAL

## 2018-11-19 DIAGNOSIS — R92.8 ABNORMAL MAMMOGRAM: ICD-10-CM

## 2018-11-19 PROCEDURE — 77065 DX MAMMO INCL CAD UNI: CPT

## 2018-11-19 PROCEDURE — 76642 ULTRASOUND BREAST LIMITED: CPT

## 2018-12-17 ENCOUNTER — OFFICE VISIT (OUTPATIENT)
Dept: FAMILY MEDICINE CLINIC | Age: 48
End: 2018-12-17
Payer: COMMERCIAL

## 2018-12-17 VITALS
SYSTOLIC BLOOD PRESSURE: 110 MMHG | HEIGHT: 66 IN | BODY MASS INDEX: 25.71 KG/M2 | WEIGHT: 160 LBS | TEMPERATURE: 99.1 F | HEART RATE: 76 BPM | RESPIRATION RATE: 16 BRPM | DIASTOLIC BLOOD PRESSURE: 60 MMHG

## 2018-12-17 DIAGNOSIS — D64.9 ANEMIA, UNSPECIFIED TYPE: ICD-10-CM

## 2018-12-17 DIAGNOSIS — R68.89 FLU-LIKE SYMPTOMS: ICD-10-CM

## 2018-12-17 DIAGNOSIS — E03.9 HYPOTHYROIDISM, UNSPECIFIED TYPE: Primary | ICD-10-CM

## 2018-12-17 DIAGNOSIS — J02.9 PHARYNGITIS, UNSPECIFIED ETIOLOGY: ICD-10-CM

## 2018-12-17 LAB
INFLUENZA A ANTIBODY: NEGATIVE
INFLUENZA B ANTIBODY: NEGATIVE
S PYO AG THROAT QL: POSITIVE

## 2018-12-17 PROCEDURE — G8484 FLU IMMUNIZE NO ADMIN: HCPCS | Performed by: FAMILY MEDICINE

## 2018-12-17 PROCEDURE — 1036F TOBACCO NON-USER: CPT | Performed by: FAMILY MEDICINE

## 2018-12-17 PROCEDURE — 87804 INFLUENZA ASSAY W/OPTIC: CPT | Performed by: FAMILY MEDICINE

## 2018-12-17 PROCEDURE — G8417 CALC BMI ABV UP PARAM F/U: HCPCS | Performed by: FAMILY MEDICINE

## 2018-12-17 PROCEDURE — 87880 STREP A ASSAY W/OPTIC: CPT | Performed by: FAMILY MEDICINE

## 2018-12-17 PROCEDURE — G8427 DOCREV CUR MEDS BY ELIG CLIN: HCPCS | Performed by: FAMILY MEDICINE

## 2018-12-17 PROCEDURE — 99213 OFFICE O/P EST LOW 20 MIN: CPT | Performed by: FAMILY MEDICINE

## 2018-12-17 RX ORDER — AMOXICILLIN 875 MG/1
875 TABLET, COATED ORAL 2 TIMES DAILY
Qty: 20 TABLET | Refills: 0 | Status: SHIPPED | OUTPATIENT
Start: 2018-12-17 | End: 2018-12-27

## 2018-12-17 RX ORDER — LEVOTHYROXINE SODIUM 0.03 MG/1
25 TABLET ORAL DAILY
Qty: 90 TABLET | Refills: 3 | Status: SHIPPED | OUTPATIENT
Start: 2018-12-17 | End: 2019-01-14 | Stop reason: SDUPTHER

## 2018-12-17 NOTE — PROGRESS NOTES
older using the  MDRD formula (not corrected for weight), is valid for stable  renal function.  Calcium 11/06/2018 9.1  8.6 - 10.2 mg/dL Final    Total Protein 11/06/2018 7.2  6.4 - 8.1 g/dL Final    Alb 11/06/2018 4.3  3.9 - 4.9 g/dL Final    Total Bilirubin 11/06/2018 0.5  0.0 - 1.2 mg/dL Final    Alkaline Phosphatase 11/06/2018 54  40 - 130 U/L Final    ALT 11/06/2018 11  0 - 33 U/L Final    AST 11/06/2018 16  0 - 35 U/L Final    Globulin 11/06/2018 2.9  2.3 - 3.5 g/dL Final   Office Visit on 10/30/2018   Component Date Value Ref Range Status    Strep A Ag 10/30/2018 None Detected  None Detected Final     Health Maintenance   Topic Date Due    HIV screen  09/11/1985    DTaP/Tdap/Td vaccine (1 - Tdap) 09/11/1989    Cervical cancer screen  07/12/2015    Flu vaccine (1) 11/05/2019 (Originally 9/1/2018)    TSH testing  11/06/2019    Lipid screen  11/06/2023       Results for POC orders placed in visit on 12/17/18   POCT Influenza A/B   Result Value Ref Range    Influenza A Ab negative     Influenza B Ab negative          Objective      Wt Readings from Last 3 Encounters:   12/17/18 160 lb (72.6 kg)   11/05/18 160 lb (72.6 kg)   10/30/18 156 lb (70.8 kg)     Temp Readings from Last 3 Encounters:   12/17/18 99.1 °F (37.3 °C) (Oral)   11/05/18 97.8 °F (36.6 °C) (Oral)   10/30/18 98.9 °F (37.2 °C) (Tympanic)     BP Readings from Last 3 Encounters:   12/17/18 110/60   11/05/18 104/60   10/30/18 114/80     Pulse Readings from Last 3 Encounters:   12/17/18 76   11/05/18 68   10/30/18 86       PHYSICAL EXAMINATION:        GENERAL:    The patient appears well nourished and well-developed,     Normal affect. Not appearing significantly anxious or depressed. No acute respiratory distress. Alert and oriented times 3. Skin:     No skin rashes. No concerning moles observed. Gait:    Normal gait. No ataxia. HEENT:  Normocephalic, atraumatic.     Throat:  Pharynx is w  erythema/mild edema no illness, follow up appointment should be made in a timely fashion with a physician.     Jennifer Glez MD

## 2019-01-04 RX ORDER — LEVOTHYROXINE SODIUM 0.03 MG/1
25 TABLET ORAL DAILY
Qty: 30 TABLET | Refills: 5 | Status: SHIPPED | OUTPATIENT
Start: 2019-01-04

## 2019-01-14 ENCOUNTER — OFFICE VISIT (OUTPATIENT)
Dept: FAMILY MEDICINE CLINIC | Age: 49
End: 2019-01-14
Payer: COMMERCIAL

## 2019-01-14 VITALS
SYSTOLIC BLOOD PRESSURE: 120 MMHG | TEMPERATURE: 98.3 F | HEIGHT: 66 IN | BODY MASS INDEX: 26.36 KG/M2 | RESPIRATION RATE: 16 BRPM | WEIGHT: 164 LBS | HEART RATE: 70 BPM | DIASTOLIC BLOOD PRESSURE: 72 MMHG

## 2019-01-14 DIAGNOSIS — J02.9 ACUTE PHARYNGITIS, UNSPECIFIED ETIOLOGY: Primary | ICD-10-CM

## 2019-01-14 DIAGNOSIS — E03.9 HYPOTHYROIDISM, UNSPECIFIED TYPE: ICD-10-CM

## 2019-01-14 DIAGNOSIS — H65.111 ACUTE MUCOID OTITIS MEDIA OF RIGHT EAR: ICD-10-CM

## 2019-01-14 LAB — S PYO AG THROAT QL: NORMAL

## 2019-01-14 PROCEDURE — 99213 OFFICE O/P EST LOW 20 MIN: CPT | Performed by: FAMILY MEDICINE

## 2019-01-14 PROCEDURE — G8417 CALC BMI ABV UP PARAM F/U: HCPCS | Performed by: FAMILY MEDICINE

## 2019-01-14 PROCEDURE — 87880 STREP A ASSAY W/OPTIC: CPT | Performed by: FAMILY MEDICINE

## 2019-01-14 PROCEDURE — 1036F TOBACCO NON-USER: CPT | Performed by: FAMILY MEDICINE

## 2019-01-14 PROCEDURE — G8484 FLU IMMUNIZE NO ADMIN: HCPCS | Performed by: FAMILY MEDICINE

## 2019-01-14 PROCEDURE — G8427 DOCREV CUR MEDS BY ELIG CLIN: HCPCS | Performed by: FAMILY MEDICINE

## 2019-01-14 RX ORDER — CEFDINIR 300 MG/1
300 CAPSULE ORAL 2 TIMES DAILY
Qty: 20 CAPSULE | Refills: 0 | Status: SHIPPED | OUTPATIENT
Start: 2019-01-14 | End: 2019-01-24

## 2019-01-14 RX ORDER — BUSPIRONE HYDROCHLORIDE 15 MG/1
15 TABLET ORAL 2 TIMES DAILY PRN
Qty: 40 TABLET | Refills: 1 | Status: SHIPPED | OUTPATIENT
Start: 2019-01-14

## 2019-01-16 ENCOUNTER — PATIENT MESSAGE (OUTPATIENT)
Dept: FAMILY MEDICINE CLINIC | Age: 49
End: 2019-01-16

## 2019-05-15 ENCOUNTER — TELEPHONE (OUTPATIENT)
Dept: FAMILY MEDICINE CLINIC | Age: 49
End: 2019-05-15

## 2023-01-25 PROBLEM — M25.569 JOINT PAIN, KNEE: Status: ACTIVE | Noted: 2023-01-25

## 2023-01-25 PROBLEM — G43.009 MIGRAINE WITHOUT AURA AND WITHOUT STATUS MIGRAINOSUS, NOT INTRACTABLE: Status: ACTIVE | Noted: 2023-01-25

## 2023-01-25 PROBLEM — M54.12 CERVICAL RADICULOPATHY, ACUTE: Status: ACTIVE | Noted: 2023-01-25

## 2023-01-25 PROBLEM — M54.50 LOW BACK PAIN, EPISODIC: Status: ACTIVE | Noted: 2023-01-25

## 2023-01-25 PROBLEM — E78.2 MODERATE MIXED HYPERLIPIDEMIA NOT REQUIRING STATIN THERAPY: Status: ACTIVE | Noted: 2023-01-25

## 2023-01-25 PROBLEM — M79.673 FOOT PAIN: Status: ACTIVE | Noted: 2023-01-25

## 2023-01-25 PROBLEM — R31.9 HEMATURIA: Status: ACTIVE | Noted: 2023-01-25

## 2023-01-25 PROBLEM — E03.8 OTHER SPECIFIED HYPOTHYROIDISM: Status: ACTIVE | Noted: 2023-01-25

## 2023-01-25 PROBLEM — M22.40 CHONDROMALACIA PATELLAE: Status: ACTIVE | Noted: 2023-01-25

## 2023-01-25 PROBLEM — M76.30 ILIOTIBIAL BAND SYNDROME: Status: ACTIVE | Noted: 2023-01-25

## 2023-01-25 PROBLEM — M22.2X9 PFS (PATELLOFEMORAL SYNDROME): Status: ACTIVE | Noted: 2023-01-25

## 2023-01-25 PROBLEM — E66.3 OVERWEIGHT WITH BODY MASS INDEX (BMI) OF 26 TO 26.9 IN ADULT: Status: ACTIVE | Noted: 2023-01-25

## 2023-01-25 PROBLEM — K80.20 GALLSTONES: Status: ACTIVE | Noted: 2023-01-25

## 2023-01-25 PROBLEM — J32.9 FREQUENT SINUS INFECTIONS: Status: ACTIVE | Noted: 2023-01-25

## 2023-01-25 PROBLEM — J06.9 VIRAL URI: Status: ACTIVE | Noted: 2023-01-25

## 2023-01-25 PROBLEM — M19.079 DJD (DEGENERATIVE JOINT DISEASE), ANKLE AND FOOT: Status: ACTIVE | Noted: 2023-01-25

## 2023-01-25 PROBLEM — L23.9 ALLERGIC DERMATITIS: Status: ACTIVE | Noted: 2023-01-25

## 2023-01-25 PROBLEM — G93.40 ENCEPHALOPATHY: Status: ACTIVE | Noted: 2023-01-25

## 2023-01-25 PROBLEM — R05.9 COUGH: Status: ACTIVE | Noted: 2023-01-25

## 2023-01-25 PROBLEM — J02.9 SORE THROAT: Status: ACTIVE | Noted: 2023-01-25

## 2023-01-25 PROBLEM — F41.9 ANXIETY: Status: ACTIVE | Noted: 2023-01-25

## 2023-01-25 RX ORDER — FLUTICASONE PROPIONATE 50 MCG
SPRAY, SUSPENSION (ML) NASAL DAILY
COMMUNITY

## 2023-01-25 RX ORDER — LORATADINE 10 MG/1
10 TABLET ORAL DAILY
COMMUNITY

## 2023-01-25 RX ORDER — SUMATRIPTAN SUCCINATE 100 MG/1
100 TABLET ORAL ONCE AS NEEDED
COMMUNITY
Start: 2022-11-08 | End: 2024-04-06 | Stop reason: ALTCHOICE

## 2023-01-25 RX ORDER — TRETINOIN 0.5 MG/G
CREAM TOPICAL
COMMUNITY
Start: 2018-11-05 | End: 2023-03-14 | Stop reason: SDUPTHER

## 2023-01-25 RX ORDER — LEVOTHYROXINE SODIUM 50 UG/1
1 TABLET ORAL DAILY
COMMUNITY
Start: 2022-01-26 | End: 2023-10-31

## 2023-01-25 RX ORDER — METFORMIN HYDROCHLORIDE 500 MG/1
1 TABLET ORAL DAILY
COMMUNITY
Start: 2022-01-26 | End: 2024-06-06 | Stop reason: SDUPTHER

## 2023-01-25 RX ORDER — ATENOLOL 50 MG/1
1 TABLET ORAL DAILY
COMMUNITY
Start: 2019-06-26 | End: 2024-03-04 | Stop reason: SDUPTHER

## 2023-01-25 RX ORDER — MULTIVITAMIN
TABLET ORAL DAILY
COMMUNITY

## 2023-01-25 RX ORDER — BUSPIRONE HYDROCHLORIDE 10 MG/1
5 TABLET ORAL 3 TIMES DAILY
COMMUNITY
End: 2024-02-05 | Stop reason: SDUPTHER

## 2023-01-25 RX ORDER — BENZONATATE 100 MG/1
100 CAPSULE ORAL 3 TIMES DAILY PRN
COMMUNITY
End: 2023-03-14 | Stop reason: ALTCHOICE

## 2023-03-09 ENCOUNTER — APPOINTMENT (OUTPATIENT)
Dept: PRIMARY CARE | Facility: CLINIC | Age: 53
End: 2023-03-09
Payer: COMMERCIAL

## 2023-03-14 ENCOUNTER — OFFICE VISIT (OUTPATIENT)
Dept: PRIMARY CARE | Facility: CLINIC | Age: 53
End: 2023-03-14
Payer: COMMERCIAL

## 2023-03-14 VITALS
DIASTOLIC BLOOD PRESSURE: 64 MMHG | TEMPERATURE: 98 F | WEIGHT: 170 LBS | BODY MASS INDEX: 27.32 KG/M2 | HEIGHT: 66 IN | RESPIRATION RATE: 16 BRPM | OXYGEN SATURATION: 98 % | HEART RATE: 82 BPM | SYSTOLIC BLOOD PRESSURE: 106 MMHG

## 2023-03-14 DIAGNOSIS — Z12.31 ENCOUNTER FOR SCREENING MAMMOGRAM FOR BREAST CANCER: ICD-10-CM

## 2023-03-14 DIAGNOSIS — Z00.00 PHYSICAL EXAM: Primary | ICD-10-CM

## 2023-03-14 DIAGNOSIS — L23.9 ALLERGIC DERMATITIS: ICD-10-CM

## 2023-03-14 DIAGNOSIS — Z12.11 COLON CANCER SCREENING: ICD-10-CM

## 2023-03-14 DIAGNOSIS — R53.83 OTHER FATIGUE: Primary | ICD-10-CM

## 2023-03-14 DIAGNOSIS — Z82.49 FAMILY HISTORY OF PREMATURE CAD: ICD-10-CM

## 2023-03-14 DIAGNOSIS — G43.009 MIGRAINE WITHOUT AURA AND WITHOUT STATUS MIGRAINOSUS, NOT INTRACTABLE: ICD-10-CM

## 2023-03-14 DIAGNOSIS — Z00.00 PHYSICAL EXAM: ICD-10-CM

## 2023-03-14 DIAGNOSIS — E03.8 OTHER SPECIFIED HYPOTHYROIDISM: ICD-10-CM

## 2023-03-14 DIAGNOSIS — F41.9 ANXIETY: ICD-10-CM

## 2023-03-14 DIAGNOSIS — E78.2 MODERATE MIXED HYPERLIPIDEMIA NOT REQUIRING STATIN THERAPY: ICD-10-CM

## 2023-03-14 PROCEDURE — 99396 PREV VISIT EST AGE 40-64: CPT | Performed by: FAMILY MEDICINE

## 2023-03-14 PROCEDURE — 1036F TOBACCO NON-USER: CPT | Performed by: FAMILY MEDICINE

## 2023-03-14 RX ORDER — TRETINOIN 0.5 MG/G
CREAM TOPICAL NIGHTLY
Qty: 45 G | Refills: 3 | Status: SHIPPED | OUTPATIENT
Start: 2023-03-14 | End: 2023-03-20

## 2023-03-14 NOTE — PROGRESS NOTES
"Subjective   Patient ID: Sari Spaulding is a 52 y.o. female who presents for Hypothyroidism and Hyperlipidemia.  Covid vax: x 2  CRC: cologuard  Mammogram: 3/2022-ordered  Pap: advised-wants ob/gyn  Lmp: 10/2020  Mandeep maddox CAD  Offered CCS    HPI  Patient Active Problem List   Diagnosis    Allergic dermatitis    Anxiety    Cervical radiculopathy, acute    Chondromalacia patellae    DJD (degenerative joint disease), ankle and foot    Frequent sinus infections    Hematuria    Iliotibial band syndrome    Joint pain, knee    Low back pain, episodic    Migraine without aura and without status migrainosus, not intractable    Moderate mixed hyperlipidemia not requiring statin therapy    Other specified hypothyroidism    PFS (patellofemoral syndrome)    Gallstones    Overweight with body mass index (BMI) of 26 to 26.9 in adult       Past Surgical History:   Procedure Laterality Date     SECTION, CLASSIC      x 2       Review of Systems  This patient has   NO history of recent Covid nor flu symptoms,  NO Fever nor chills,  NO Chest pain, shortness of breath nor paroxysmal nocturnal dyspnea,  NO Nausea, vomiting, nor diarrhea,  NO Hematochezia nor melena,  NO Dysuria, hematuria, nor new incontinence issues  NO new severe headaches nor neurological complaints,  NO new issues with anxiety nor depression nor new psychiatric complaints,  NO suicidal nor homicidal ideations.     OBJECTIVE:  /64 (BP Location: Right arm, Patient Position: Sitting, BP Cuff Size: Adult)   Pulse 82   Temp 36.7 °C (98 °F) (Temporal)   Resp 16   Ht 1.676 m (5' 6\") Comment: 5'6  Wt 77.1 kg (170 lb) Comment: 170 lb  LMP 10/01/2020 (Approximate)   SpO2 98%   BMI 27.44 kg/m²      General:  alert, oriented, no acute distress.  No obvious skin rashes noted.   No gait disturbance noted.    Mood is pleasant, not tearful, no signs of emotional distress.  Not appearing intoxicated or altered.   No voiced delusions,   Normal, appropriate " behavior.    HEENT: Normocephalic, atraumatic,   Pupils round, reactive to light  Extraocular motions intact and wnl  Tympanic membranes normal    Neck: no nuchal rigidity  No masses palpable.  No carotid bruits.  No thyromegaly.    Respiratory: Equal breath sounds  No wheezes,    rales,    nor rhonchi  No respiratory distress.    Heart: Regular rate and rhythm, no    murmurs  no rubs/gallops    Abdomen: no masses palpable, no rebound nor guarding, no rebound nor guarding.    Extremities: NO cyanosis noted, no clubbing.   No edema noted.  2+dorsalis pedis pulses.    Normal-not antalgic, steady gait.    No visits with results within 3 Month(s) from this visit.   Latest known visit with results is:   Legacy Encounter on 11/11/2022   Component Date Value Ref Range Status    Glucose 11/11/2022 68 (L)  74 - 99 mg/dL Final    Sodium 11/11/2022 140  136 - 145 mmol/L Final    Potassium 11/11/2022 3.8  3.5 - 5.3 mmol/L Final    Chloride 11/11/2022 104  98 - 107 mmol/L Final    Bicarbonate 11/11/2022 30  21 - 32 mmol/L Final    Anion Gap 11/11/2022 10  10 - 20 mmol/L Final    Urea Nitrogen 11/11/2022 17  6 - 23 mg/dL Final    Creatinine 11/11/2022 0.69  0.50 - 1.05 mg/dL Final    GFR Female 11/11/2022 >90  >90 mL/min/1.73m2 Final    Comment:  CALCULATIONS OF ESTIMATED GFR ARE PERFORMED   USING THE 2021 CKD-EPI STUDY REFIT EQUATION   WITHOUT THE RACE VARIABLE FOR THE IDMS-TRACEABLE   CREATININE METHODS.    https://jasn.asnjournals.org/content/early/2021/09/22/ASN.4744637394      Calcium 11/11/2022 9.3  8.6 - 10.3 mg/dL Final    Albumin 11/11/2022 4.1  3.4 - 5.0 g/dL Final    Alkaline Phosphatase 11/11/2022 51  33 - 110 U/L Final    Total Protein 11/11/2022 6.8  6.4 - 8.2 g/dL Final    AST 11/11/2022 18  9 - 39 U/L Final    Total Bilirubin 11/11/2022 0.5  0.0 - 1.2 mg/dL Final    ALT (SGPT) 11/11/2022 15  7 - 45 U/L Final    Comment:  Patients treated with Sulfasalazine may generate    falsely decreased results for ALT.           Assessment/Plan     Problem List Items Addressed This Visit          Endocrine/Metabolic    Other specified hypothyroidism    Relevant Orders    Free T4 Index    Thyroid Stimulating Hormone       Other    Allergic dermatitis    Relevant Medications    tretinoin (Retin-A) 0.05 % cream    Anxiety    Migraine without aura and without status migrainosus, not intractable    Moderate mixed hyperlipidemia not requiring statin therapy    Relevant Orders    CBC and Auto Differential    Comprehensive Metabolic Panel    Hemoglobin A1C    Lipid Panel    Free T4 Index    Thyroid Stimulating Hormone     Other Visit Diagnoses       Other fatigue    -  Primary    Relevant Orders    Ferritin    Vitamin B12    Vitamin D 1,25 Dihydroxy    Folate    Encounter for screening mammogram for breast cancer        Relevant Orders    BI mammo bilateral screening tomosynthesis    Colon cancer screening        Relevant Orders    Cologuard® colon cancer screening    Physical exam              Labs due  The patient is aware that results will be forthcoming of ALL planned labs and or tests. If no results are received on my chart or by letter within 1 - 3 weeks, the patient is aware they need to call to obtain results as this is not usual.   Will repeat hba1c as was 5.4 and on metformin  Low carb diet  See me 6mo  Bmi 27 at home 162    I have discussed the collaborative care model for this patient’s behavioral health care. Written detailed information and identifying the members of this care team was provided to patient. They give permission for the Behavioral Health Manager (BHM) and psychiatric consultant to be included in their care with my continued primary management. Patient made aware that services provided as part of the Collaborative Care Model are subject to cost sharing.

## 2023-03-14 NOTE — PROGRESS NOTES
Covid vax: x 2  CRC: cologuard  Mammogram: 3/2022-ordered  Pap: advised-wants ob/gyn  Lmp: 10/2020

## 2023-03-17 ENCOUNTER — TELEPHONE (OUTPATIENT)
Dept: PRIMARY CARE | Facility: CLINIC | Age: 53
End: 2023-03-17
Payer: COMMERCIAL

## 2023-03-19 DIAGNOSIS — L23.9 ALLERGIC DERMATITIS: ICD-10-CM

## 2023-03-20 ENCOUNTER — TELEPHONE (OUTPATIENT)
Dept: PHARMACY | Facility: HOSPITAL | Age: 53
End: 2023-03-20
Payer: COMMERCIAL

## 2023-03-20 RX ORDER — TRETINOIN 0.5 MG/G
CREAM TOPICAL
Qty: 135 G | Refills: 1 | Status: SHIPPED | OUTPATIENT
Start: 2023-03-20 | End: 2024-04-06 | Stop reason: SDUPTHER

## 2023-03-20 NOTE — TELEPHONE ENCOUNTER
Prior Authorization    Prior authorization approved for tretinoin 0.05% cream. LVM to notify of approval. Patient is to call pharmacy to notify of prior authorization approval. If additional assistance is needed, advised patient to call [facility] at [phone].    Please reach out to clinical pharmacy team with questions/concerns.    Thank you,  Shagufta Chavez, PharmD

## 2023-03-21 ENCOUNTER — CLINICAL SUPPORT (OUTPATIENT)
Dept: PRIMARY CARE | Facility: CLINIC | Age: 53
End: 2023-03-21
Payer: COMMERCIAL

## 2023-03-21 DIAGNOSIS — Z23 NEED FOR VACCINATION: ICD-10-CM

## 2023-03-21 PROCEDURE — 90750 HZV VACC RECOMBINANT IM: CPT | Performed by: FAMILY MEDICINE

## 2023-03-21 PROCEDURE — 90471 IMMUNIZATION ADMIN: CPT | Performed by: FAMILY MEDICINE

## 2023-03-21 NOTE — PROGRESS NOTES
Patient here today for 2nd and final Shingrix vaccine. Administered in left arm. Patient tolerated well.

## 2023-03-25 ENCOUNTER — LAB (OUTPATIENT)
Dept: LAB | Facility: LAB | Age: 53
End: 2023-03-25
Payer: COMMERCIAL

## 2023-03-25 DIAGNOSIS — E78.2 MODERATE MIXED HYPERLIPIDEMIA NOT REQUIRING STATIN THERAPY: ICD-10-CM

## 2023-03-25 DIAGNOSIS — R53.83 OTHER FATIGUE: ICD-10-CM

## 2023-03-25 DIAGNOSIS — E03.8 OTHER SPECIFIED HYPOTHYROIDISM: ICD-10-CM

## 2023-03-25 LAB
ALANINE AMINOTRANSFERASE (SGPT) (U/L) IN SER/PLAS: 11 U/L (ref 7–45)
ALBUMIN (G/DL) IN SER/PLAS: 4.2 G/DL (ref 3.4–5)
ALKALINE PHOSPHATASE (U/L) IN SER/PLAS: 53 U/L (ref 33–110)
ANION GAP IN SER/PLAS: 12 MMOL/L (ref 10–20)
ASPARTATE AMINOTRANSFERASE (SGOT) (U/L) IN SER/PLAS: 14 U/L (ref 9–39)
BASOPHILS (10*3/UL) IN BLOOD BY AUTOMATED COUNT: 0.04 X10E9/L (ref 0–0.1)
BASOPHILS/100 LEUKOCYTES IN BLOOD BY AUTOMATED COUNT: 0.9 % (ref 0–2)
BILIRUBIN TOTAL (MG/DL) IN SER/PLAS: 0.7 MG/DL (ref 0–1.2)
CALCIUM (MG/DL) IN SER/PLAS: 9 MG/DL (ref 8.6–10.3)
CARBON DIOXIDE, TOTAL (MMOL/L) IN SER/PLAS: 27 MMOL/L (ref 21–32)
CHLORIDE (MMOL/L) IN SER/PLAS: 105 MMOL/L (ref 98–107)
CHOLESTEROL (MG/DL) IN SER/PLAS: 197 MG/DL (ref 0–199)
CHOLESTEROL IN HDL (MG/DL) IN SER/PLAS: 89.5 MG/DL
CHOLESTEROL/HDL RATIO: 2.2
COBALAMIN (VITAMIN B12) (PG/ML) IN SER/PLAS: 461 PG/ML (ref 211–911)
CREATININE (MG/DL) IN SER/PLAS: 0.68 MG/DL (ref 0.5–1.05)
EOSINOPHILS (10*3/UL) IN BLOOD BY AUTOMATED COUNT: 0.14 X10E9/L (ref 0–0.7)
EOSINOPHILS/100 LEUKOCYTES IN BLOOD BY AUTOMATED COUNT: 3.2 % (ref 0–6)
ERYTHROCYTE DISTRIBUTION WIDTH (RATIO) BY AUTOMATED COUNT: 13.6 % (ref 11.5–14.5)
ERYTHROCYTE MEAN CORPUSCULAR HEMOGLOBIN CONCENTRATION (G/DL) BY AUTOMATED: 32.2 G/DL (ref 32–36)
ERYTHROCYTE MEAN CORPUSCULAR VOLUME (FL) BY AUTOMATED COUNT: 83 FL (ref 80–100)
ERYTHROCYTES (10*6/UL) IN BLOOD BY AUTOMATED COUNT: 4.65 X10E12/L (ref 4–5.2)
FERRITIN (UG/LL) IN SER/PLAS: 53 UG/L (ref 8–150)
FOLATE (NG/ML) IN SER/PLAS: 17.4 NG/ML
GFR FEMALE: >90 ML/MIN/1.73M2
GLUCOSE (MG/DL) IN SER/PLAS: 88 MG/DL (ref 74–99)
HEMATOCRIT (%) IN BLOOD BY AUTOMATED COUNT: 38.8 % (ref 36–46)
HEMOGLOBIN (G/DL) IN BLOOD: 12.5 G/DL (ref 12–16)
IMMATURE GRANULOCYTES/100 LEUKOCYTES IN BLOOD BY AUTOMATED COUNT: 0.5 % (ref 0–0.9)
LDL: 99 MG/DL (ref 0–99)
LEUKOCYTES (10*3/UL) IN BLOOD BY AUTOMATED COUNT: 4.4 X10E9/L (ref 4.4–11.3)
LYMPHOCYTES (10*3/UL) IN BLOOD BY AUTOMATED COUNT: 1.46 X10E9/L (ref 1.2–4.8)
LYMPHOCYTES/100 LEUKOCYTES IN BLOOD BY AUTOMATED COUNT: 33.1 % (ref 13–44)
MONOCYTES (10*3/UL) IN BLOOD BY AUTOMATED COUNT: 0.31 X10E9/L (ref 0.1–1)
MONOCYTES/100 LEUKOCYTES IN BLOOD BY AUTOMATED COUNT: 7 % (ref 2–10)
NEUTROPHILS (10*3/UL) IN BLOOD BY AUTOMATED COUNT: 2.44 X10E9/L (ref 1.2–7.7)
NEUTROPHILS/100 LEUKOCYTES IN BLOOD BY AUTOMATED COUNT: 55.3 % (ref 40–80)
PLATELETS (10*3/UL) IN BLOOD AUTOMATED COUNT: 275 X10E9/L (ref 150–450)
POTASSIUM (MMOL/L) IN SER/PLAS: 3.9 MMOL/L (ref 3.5–5.3)
PROTEIN TOTAL: 6.8 G/DL (ref 6.4–8.2)
SODIUM (MMOL/L) IN SER/PLAS: 140 MMOL/L (ref 136–145)
THYROTROPIN (MIU/L) IN SER/PLAS BY DETECTION LIMIT <= 0.05 MIU/L: 1.8 MIU/L (ref 0.44–3.98)
THYROXINE (T4) (UG/DL) IN SER/PLAS: 6.6 UG/DL (ref 4.5–11.1)
THYROXINE (T4) FREE INDEX IN SER/PLAS BY CALCULATION: 2.4 (ref 1.6–4.7)
TRIGLYCERIDE (MG/DL) IN SER/PLAS: 42 MG/DL (ref 0–149)
TRIIODOTHYRONINE RESIN UPTAKE (T3RU) % IN SER/PLAS: 37 % (ref 24–41)
UREA NITROGEN (MG/DL) IN SER/PLAS: 14 MG/DL (ref 6–23)
VLDL: 8 MG/DL (ref 0–40)

## 2023-03-25 PROCEDURE — 82652 VIT D 1 25-DIHYDROXY: CPT

## 2023-03-25 PROCEDURE — 85025 COMPLETE CBC W/AUTO DIFF WBC: CPT

## 2023-03-25 PROCEDURE — 82607 VITAMIN B-12: CPT

## 2023-03-25 PROCEDURE — 84436 ASSAY OF TOTAL THYROXINE: CPT

## 2023-03-25 PROCEDURE — 80061 LIPID PANEL: CPT

## 2023-03-25 PROCEDURE — 83036 HEMOGLOBIN GLYCOSYLATED A1C: CPT

## 2023-03-25 PROCEDURE — 84443 ASSAY THYROID STIM HORMONE: CPT

## 2023-03-25 PROCEDURE — 82746 ASSAY OF FOLIC ACID SERUM: CPT

## 2023-03-25 PROCEDURE — 80053 COMPREHEN METABOLIC PANEL: CPT

## 2023-03-25 PROCEDURE — 36415 COLL VENOUS BLD VENIPUNCTURE: CPT

## 2023-03-25 PROCEDURE — 84479 ASSAY OF THYROID (T3 OR T4): CPT

## 2023-03-25 PROCEDURE — 82728 ASSAY OF FERRITIN: CPT

## 2023-03-26 LAB
ESTIMATED AVERAGE GLUCOSE FOR HBA1C: 103 MG/DL
HEMOGLOBIN A1C/HEMOGLOBIN TOTAL IN BLOOD: 5.2 %

## 2023-03-28 ENCOUNTER — TELEPHONE (OUTPATIENT)
Dept: PRIMARY CARE | Facility: CLINIC | Age: 53
End: 2023-03-28
Payer: COMMERCIAL

## 2023-03-28 NOTE — PROGRESS NOTES
Outreach #1. Writer attempted to outreach pt regarding their [facility] referral. LVM requesting follow up.

## 2023-03-29 LAB — VITAMIN D 1,25-DIHYDROXY: 32.5 PG/ML (ref 19.9–79.3)

## 2023-04-03 ENCOUNTER — TELEPHONE (OUTPATIENT)
Dept: PRIMARY CARE | Facility: CLINIC | Age: 53
End: 2023-04-03
Payer: COMMERCIAL

## 2023-04-03 NOTE — PROGRESS NOTES
Outreach #2. Writer attempted to outreach pt regarding their [facility] referral. LVM requesting follow up. Possible referral closure without follow up.

## 2023-04-12 ENCOUNTER — TELEPHONE (OUTPATIENT)
Dept: PRIMARY CARE | Facility: CLINIC | Age: 53
End: 2023-04-12
Payer: COMMERCIAL

## 2023-04-12 NOTE — PROGRESS NOTES
Writeharry received a VM from patient following up on [facility] referral. LVM requesting follow up with her continued interest.

## 2023-04-25 ENCOUNTER — OFFICE VISIT (OUTPATIENT)
Dept: OBGYN CLINIC | Age: 53
End: 2023-04-25
Payer: COMMERCIAL

## 2023-04-25 VITALS
DIASTOLIC BLOOD PRESSURE: 70 MMHG | WEIGHT: 170 LBS | BODY MASS INDEX: 27.32 KG/M2 | SYSTOLIC BLOOD PRESSURE: 118 MMHG | HEIGHT: 66 IN

## 2023-04-25 DIAGNOSIS — Z01.419 WELL FEMALE EXAM WITH ROUTINE GYNECOLOGICAL EXAM: Primary | ICD-10-CM

## 2023-04-25 DIAGNOSIS — Z01.419 WELL FEMALE EXAM WITH ROUTINE GYNECOLOGICAL EXAM: ICD-10-CM

## 2023-04-25 DIAGNOSIS — Z12.4 SCREENING FOR CERVICAL CANCER: ICD-10-CM

## 2023-04-25 DIAGNOSIS — Z12.11 SCREENING FOR COLON CANCER: ICD-10-CM

## 2023-04-25 PROCEDURE — 1036F TOBACCO NON-USER: CPT | Performed by: OBSTETRICS & GYNECOLOGY

## 2023-04-25 PROCEDURE — 99386 PREV VISIT NEW AGE 40-64: CPT | Performed by: OBSTETRICS & GYNECOLOGY

## 2023-04-25 PROCEDURE — G8427 DOCREV CUR MEDS BY ELIG CLIN: HCPCS | Performed by: OBSTETRICS & GYNECOLOGY

## 2023-04-25 PROCEDURE — G8419 CALC BMI OUT NRM PARAM NOF/U: HCPCS | Performed by: OBSTETRICS & GYNECOLOGY

## 2023-04-25 PROCEDURE — 3017F COLORECTAL CA SCREEN DOC REV: CPT | Performed by: OBSTETRICS & GYNECOLOGY

## 2023-04-25 ASSESSMENT — ENCOUNTER SYMPTOMS
ABDOMINAL PAIN: 0
SHORTNESS OF BREATH: 0
BLOOD IN STOOL: 0
SORE THROAT: 0
COUGH: 0
WHEEZING: 0
VOMITING: 0
DIARRHEA: 0
ABDOMINAL DISTENTION: 0
NAUSEA: 0
CONSTIPATION: 0

## 2023-04-25 ASSESSMENT — PATIENT HEALTH QUESTIONNAIRE - PHQ9
SUM OF ALL RESPONSES TO PHQ QUESTIONS 1-9: 0
SUM OF ALL RESPONSES TO PHQ QUESTIONS 1-9: 0
2. FEELING DOWN, DEPRESSED OR HOPELESS: 0
SUM OF ALL RESPONSES TO PHQ QUESTIONS 1-9: 0
1. LITTLE INTEREST OR PLEASURE IN DOING THINGS: 0
SUM OF ALL RESPONSES TO PHQ QUESTIONS 1-9: 0
SUM OF ALL RESPONSES TO PHQ9 QUESTIONS 1 & 2: 0

## 2023-04-25 NOTE — PROGRESS NOTES
Postmenopausal Ricardo Kern is a 46y.o. year old   female who presents today for her annual well woman exam.  The patient is sexually active. The patient has never been taking hormone replacement therapy. Patient denies post-menopausal vaginal bleeding. The patient has regular exercise: no    Vitals:  /70   Ht 5' 6\" (1.676 m)   Wt 170 lb (77.1 kg)   LMP 2018 (Approximate)   BMI 27.44 kg/m²   Allergies:  Levofloxacin  No past medical history on file. Past Surgical History:   Procedure Laterality Date     SECTION      x 2     Family History   Problem Relation Age of Onset    Heart Disease Father         late 46s     Social History     Socioeconomic History    Marital status:      Spouse name: Not on file    Number of children: Not on file    Years of education: Not on file    Highest education level: Not on file   Occupational History    Not on file   Tobacco Use    Smoking status: Never    Smokeless tobacco: Never   Substance and Sexual Activity    Alcohol use: Yes     Comment: rare    Drug use: Never    Sexual activity: Yes     Partners: Male   Other Topics Concern    Not on file   Social History Narrative    Not on file     Social Determinants of Health     Financial Resource Strain: Not on file   Food Insecurity: Not on file   Transportation Needs: Not on file   Physical Activity: Not on file   Stress: Not on file   Social Connections: Not on file   Intimate Partner Violence: Not on file   Housing Stability: Not on file       Last mammogram   Breast cancer risk factors : no known risk  Last Pap remote past    Patient's last menstrual period was 2018 (approximate).   Age at menopause onset: 3yrs  Menopausal symptom assessment: none  Urinary incontinence & GUsymptoms: none  Sexual dysfunction: none  Present Hormonal medications: none    Osteoporosis risk assessment : Small body frame  Last bone mineral density : pending     History of abnormal

## 2023-04-28 LAB
HPV HR 12 DNA SPEC QL NAA+PROBE: NOT DETECTED
HPV16 DNA SPEC QL NAA+PROBE: NOT DETECTED
HPV16+18+H RISK 12 DNA SPEC-IMP: NORMAL
HPV18 DNA SPEC QL NAA+PROBE: NOT DETECTED

## 2023-05-02 ENCOUNTER — TELEPHONE (OUTPATIENT)
Dept: PRIMARY CARE | Facility: CLINIC | Age: 53
End: 2023-05-02
Payer: COMMERCIAL

## 2023-05-02 NOTE — PROGRESS NOTES
Outreach attempt on second referral to [facility]: Writer LISA for pt requesting follow up with continued interest in services.

## 2023-06-13 ENCOUNTER — PREP FOR PROCEDURE (OUTPATIENT)
Dept: GASTROENTEROLOGY | Age: 53
End: 2023-06-13

## 2023-06-13 RX ORDER — SODIUM CHLORIDE 9 MG/ML
INJECTION, SOLUTION INTRAVENOUS PRN
Status: CANCELLED | OUTPATIENT
Start: 2023-06-13

## 2023-06-13 RX ORDER — SODIUM CHLORIDE 9 MG/ML
INJECTION, SOLUTION INTRAVENOUS CONTINUOUS
Status: CANCELLED | OUTPATIENT
Start: 2023-06-13

## 2023-06-13 RX ORDER — SODIUM CHLORIDE 0.9 % (FLUSH) 0.9 %
5-40 SYRINGE (ML) INJECTION EVERY 12 HOURS SCHEDULED
Status: CANCELLED | OUTPATIENT
Start: 2023-06-13

## 2023-09-26 ENCOUNTER — APPOINTMENT (OUTPATIENT)
Dept: PRIMARY CARE | Facility: CLINIC | Age: 53
End: 2023-09-26
Payer: COMMERCIAL

## 2023-10-23 PROBLEM — R05.9 COUGH: Status: ACTIVE | Noted: 2023-10-23

## 2023-10-23 PROBLEM — H66.92 LEFT OTITIS MEDIA: Status: ACTIVE | Noted: 2023-10-23

## 2023-10-23 PROBLEM — J06.9 VIRAL URI: Status: ACTIVE | Noted: 2023-10-23

## 2023-10-23 PROBLEM — G93.40 ENCEPHALOPATHY: Status: ACTIVE | Noted: 2023-10-23

## 2023-10-23 PROBLEM — M79.673 FOOT PAIN: Status: ACTIVE | Noted: 2023-10-23

## 2023-10-23 PROBLEM — J02.9 SORE THROAT: Status: ACTIVE | Noted: 2023-10-23

## 2023-10-23 RX ORDER — TOPIRAMATE 25 MG/1
TABLET ORAL
COMMUNITY
Start: 2023-07-25 | End: 2024-04-06 | Stop reason: ALTCHOICE

## 2023-10-23 RX ORDER — AMOXICILLIN 875 MG/1
1 TABLET, FILM COATED ORAL EVERY 12 HOURS
COMMUNITY
Start: 2023-01-06 | End: 2024-04-06 | Stop reason: ALTCHOICE

## 2023-10-23 RX ORDER — BENZONATATE 100 MG/1
1 CAPSULE ORAL 3 TIMES DAILY PRN
COMMUNITY
Start: 2023-01-06 | End: 2024-04-06 | Stop reason: ALTCHOICE

## 2023-10-23 RX ORDER — MELOXICAM 7.5 MG/1
7.5 TABLET ORAL
COMMUNITY
Start: 2023-10-18 | End: 2024-04-06 | Stop reason: ALTCHOICE

## 2023-10-23 RX ORDER — PHENOL 1.4 %
AEROSOL, SPRAY (ML) MUCOUS MEMBRANE
COMMUNITY
Start: 2019-12-09 | End: 2024-04-06 | Stop reason: ALTCHOICE

## 2023-10-23 RX ORDER — BROMPHENIRAMINE MALEATE, PSEUDOEPHEDRINE HYDROCHLORIDE, AND DEXTROMETHORPHAN HYDROBROMIDE 2; 30; 10 MG/5ML; MG/5ML; MG/5ML
SYRUP ORAL
COMMUNITY
Start: 2022-12-18 | End: 2024-04-06 | Stop reason: ALTCHOICE

## 2023-10-23 RX ORDER — COVID-19 MOLECULAR TEST ASSAY
KIT MISCELLANEOUS
COMMUNITY
Start: 2023-02-23 | End: 2024-04-06 | Stop reason: ALTCHOICE

## 2023-10-30 ENCOUNTER — APPOINTMENT (OUTPATIENT)
Dept: NEUROLOGY | Facility: CLINIC | Age: 53
End: 2023-10-30
Payer: COMMERCIAL

## 2023-10-31 DIAGNOSIS — E03.8 OTHER SPECIFIED HYPOTHYROIDISM: ICD-10-CM

## 2023-10-31 RX ORDER — LEVOTHYROXINE SODIUM 50 UG/1
50 TABLET ORAL DAILY
Qty: 90 TABLET | Refills: 3 | Status: SHIPPED | OUTPATIENT
Start: 2023-10-31

## 2023-11-09 ENCOUNTER — OFFICE VISIT (OUTPATIENT)
Dept: NEUROLOGY | Facility: CLINIC | Age: 53
End: 2023-11-09
Payer: COMMERCIAL

## 2023-11-09 VITALS
DIASTOLIC BLOOD PRESSURE: 66 MMHG | HEIGHT: 66 IN | HEART RATE: 76 BPM | WEIGHT: 170 LBS | BODY MASS INDEX: 27.32 KG/M2 | SYSTOLIC BLOOD PRESSURE: 102 MMHG

## 2023-11-09 DIAGNOSIS — G93.40 ENCEPHALOPATHY: ICD-10-CM

## 2023-11-09 DIAGNOSIS — G43.009 MIGRAINE WITHOUT AURA AND WITHOUT STATUS MIGRAINOSUS, NOT INTRACTABLE: Primary | ICD-10-CM

## 2023-11-09 PROCEDURE — 1036F TOBACCO NON-USER: CPT | Performed by: PSYCHIATRY & NEUROLOGY

## 2023-11-09 PROCEDURE — 99214 OFFICE O/P EST MOD 30 MIN: CPT | Performed by: PSYCHIATRY & NEUROLOGY

## 2023-11-09 ASSESSMENT — PATIENT HEALTH QUESTIONNAIRE - PHQ9
1. LITTLE INTEREST OR PLEASURE IN DOING THINGS: NOT AT ALL
SUM OF ALL RESPONSES TO PHQ9 QUESTIONS 1 & 2: 0
2. FEELING DOWN, DEPRESSED OR HOPELESS: NOT AT ALL

## 2023-11-09 NOTE — PROGRESS NOTES
"Nila Spaulding is a 53 y.o.   female.  HPI  The patient is here being seen to follow up with their migraines. They are currently taking? Atenolol 50mg daily and Ubrelvy 100mg as needed for her migraine and it has been effective. The patient is averaging? 5-8? headaches/migraines a month, as compared to 10-12 headaches/migraines previously. The patient is not having side effects from this current medication regimen and is avoiding their headache/migraine triggers to the best of their ability. She is able to continue working after taking her Ubrelvy without issue. She is occasionally taking NSAIDs for adjunct therapy. Neurological exam is normal. I have reviewed the medications and the chart.? Review of systems are negative unless otherwise specified in HPI.      Objective   Neurological Exam  Physical Exam  Mental status: Awake & alert, no acute distress. Oriented to person, place, and time. Speech fluent, clear. Naming, repetition, and comprehension intact. Short term memory intact and tested by word recall. Attention intact with naming months of year backwards.  Cardiopulmonary: HR and rhythm regular, normal S1, S2, no murmur auscultated, no bruits heard bilateral carotid arteries. CTAB, no evidence of respiratory distress. Abdomen is soft, non distended, no organ megaly. No edema present in extremities, pulses are +2 throughout.   CN: PERRLA, pupils were 3/3mm to 2/2mm, Visual field intact x 4 with finger counting. EOM intact. Facial sensation intact to light touch. No facial asymmetry. Palate elevated symmetrically with \"ahh\". Shoulder shrug symmetric. Tongue protruded midline.   Motor: Normal muscle bulk & tone, strength 5/5 BUE/BLE, no abnormal movement.   Sensory: Intact to light touch, no issue with differentiation. No tactile extinction.  Coordination: Finger to nose without overshoot.  Reflexes: 2+ bilaterally in biceps, triceps, brachialradialis, patella, and achilles.  Gait: normal. Intact " with heel walking, toe walking, and tandem gait.   Assessment/Plan   Discussed following up in? 4? months, medication order will be sent to pharmacy. I gave her some samples of Ubrelvy (4 boxes of 1-100mg tablet). Discussed taking the supplement Migravant, which can be purchased OTC and contains B12, Magnesium, Butterbur, CoQ10, and Bioperine. Discussed avoiding triggers that worsen migraine (specific food, lack of sleep, stress, lights, etc.). Keep a migraine diary with frequency, severity, and duration, include other symptoms. Discussed taking abortive medicine as they are getting a migraine and taking preventative medicine daily (if indicated). Avoid taking OTC’s such as Tylenol and Ibuprofen daily, as these can cause rebound headache if stopped. Discussed if abortive therapy is not effective, or? getting > 15 migraines per month-contact neurology for an appointment. The goal of care is to decrease the number and severity of your migraine, medication will help, but does not completely cure you of migraine.?      Seen and examined.  Agree with the plan and continue with current treatment and take Ubrelvy only as needed.  Headache diary food diary were discussed with patient which patient understood.

## 2024-02-01 RX ORDER — BUSPIRONE HYDROCHLORIDE 10 MG/1
TABLET ORAL
Qty: 405 TABLET | Refills: 3 | Status: CANCELLED | OUTPATIENT
Start: 2024-02-01

## 2024-02-02 ENCOUNTER — TELEPHONE (OUTPATIENT)
Dept: NEUROLOGY | Facility: CLINIC | Age: 54
End: 2024-02-02
Payer: COMMERCIAL

## 2024-02-02 DIAGNOSIS — F41.9 ANXIETY: Primary | ICD-10-CM

## 2024-02-02 NOTE — TELEPHONE ENCOUNTER
Pt called asking for a refill on her buspirone HCL 10 mg  (1 and 1/2 tablets 3 x day) [facility]  You lasted filled it in September for her.  Next appt 3/4.

## 2024-02-05 RX ORDER — BUSPIRONE HYDROCHLORIDE 10 MG/1
5 TABLET ORAL 3 TIMES DAILY
Qty: 135 TABLET | Refills: 3 | Status: SHIPPED | OUTPATIENT
Start: 2024-02-05 | End: 2024-03-04 | Stop reason: SDUPTHER

## 2024-03-04 ENCOUNTER — OFFICE VISIT (OUTPATIENT)
Dept: NEUROLOGY | Facility: CLINIC | Age: 54
End: 2024-03-04
Payer: COMMERCIAL

## 2024-03-04 VITALS
WEIGHT: 177.6 LBS | SYSTOLIC BLOOD PRESSURE: 123 MMHG | HEIGHT: 66 IN | HEART RATE: 82 BPM | BODY MASS INDEX: 28.54 KG/M2 | DIASTOLIC BLOOD PRESSURE: 84 MMHG

## 2024-03-04 DIAGNOSIS — F41.9 ANXIETY: ICD-10-CM

## 2024-03-04 DIAGNOSIS — G43.009 MIGRAINE WITHOUT AURA AND WITHOUT STATUS MIGRAINOSUS, NOT INTRACTABLE: Primary | ICD-10-CM

## 2024-03-04 DIAGNOSIS — G93.40 ENCEPHALOPATHY: ICD-10-CM

## 2024-03-04 PROCEDURE — 99214 OFFICE O/P EST MOD 30 MIN: CPT | Performed by: PSYCHIATRY & NEUROLOGY

## 2024-03-04 PROCEDURE — 1036F TOBACCO NON-USER: CPT | Performed by: PSYCHIATRY & NEUROLOGY

## 2024-03-04 RX ORDER — BUSPIRONE HYDROCHLORIDE 15 MG/1
15 TABLET ORAL 3 TIMES DAILY
Qty: 270 TABLET | Refills: 3 | Status: SHIPPED | OUTPATIENT
Start: 2024-03-04

## 2024-03-04 RX ORDER — ATENOLOL 50 MG/1
50 TABLET ORAL DAILY
Qty: 90 TABLET | Refills: 3 | Status: SHIPPED | OUTPATIENT
Start: 2024-03-04

## 2024-03-04 ASSESSMENT — ENCOUNTER SYMPTOMS
ADENOPATHY: 0
TROUBLE SWALLOWING: 0
EYE PAIN: 0
COUGH: 0
AGITATION: 0
LIGHT-HEADEDNESS: 0
PALPITATIONS: 0
CONFUSION: 0
JOINT SWELLING: 0
FACIAL ASYMMETRY: 0
VOMITING: 0
SHORTNESS OF BREATH: 0
SEIZURES: 0
FEVER: 0
HEADACHES: 0
SPEECH DIFFICULTY: 0
BACK PAIN: 0
SINUS PRESSURE: 0
HALLUCINATIONS: 0
PHOTOPHOBIA: 0
NECK STIFFNESS: 0
ARTHRALGIAS: 0
NECK PAIN: 0
NUMBNESS: 0
FATIGUE: 0
ABDOMINAL PAIN: 0
HYPERACTIVE: 0
BRUISES/BLEEDS EASILY: 0
SLEEP DISTURBANCE: 0
TREMORS: 0
DIFFICULTY URINATING: 0
WEAKNESS: 0
WHEEZING: 0
FREQUENCY: 0
DIZZINESS: 0
UNEXPECTED WEIGHT CHANGE: 0
NAUSEA: 0

## 2024-03-04 ASSESSMENT — PATIENT HEALTH QUESTIONNAIRE - PHQ9
SUM OF ALL RESPONSES TO PHQ9 QUESTIONS 1 & 2: 0
1. LITTLE INTEREST OR PLEASURE IN DOING THINGS: NOT AT ALL
2. FEELING DOWN, DEPRESSED OR HOPELESS: NOT AT ALL

## 2024-03-04 NOTE — PROGRESS NOTES
Sari Coronelchadr  53 y.o.       SUBJECTIVE    HPI   Sari is a 53-year-old young lady who was seen today for follow-up of her recurrent migraine headache with history of generalized anxiety disorder and multiple other medical issues since last seen after starting her on atenolol and BuSpar she is doing very well and her headache frequency and severity has gone down.  She is using Ubrelvy with does seem to be helping her for her migraine headache.  No side effects of the medication today her physical and neurological examination was nonfocal I would like to continue all her medications the way she is taking and have discussed the headache diary food diary and the importance of sleep and hygiene and depending on how she does I might make future recommendation when she comes back to see me in 6 months    I did review the medication list.      Due to technical limitations of voice recognition and human error, this note may not accurately reflect the care of the patient.    Review of Systems   Constitutional:  Negative for fatigue, fever and unexpected weight change.   HENT:  Negative for dental problem, ear pain, hearing loss, sinus pressure, tinnitus and trouble swallowing.    Eyes:  Negative for photophobia, pain and visual disturbance.   Respiratory:  Negative for cough, shortness of breath and wheezing.    Cardiovascular:  Negative for chest pain, palpitations and leg swelling.   Gastrointestinal:  Negative for abdominal pain, nausea and vomiting.   Genitourinary:  Negative for difficulty urinating, enuresis and frequency.   Musculoskeletal:  Negative for arthralgias, back pain, joint swelling, neck pain and neck stiffness.   Skin:  Negative for pallor and rash.   Allergic/Immunologic: Negative for food allergies.   Neurological:  Negative for dizziness, tremors, seizures, syncope, facial asymmetry, speech difficulty, weakness, light-headedness, numbness and headaches.   Hematological:  Negative for adenopathy. Does  "not bruise/bleed easily.   Psychiatric/Behavioral:  Negative for agitation, behavioral problems, confusion, hallucinations and sleep disturbance. The patient is not hyperactive.         Patient Active Problem List   Diagnosis    Allergic dermatitis    Anxiety    Cervical radiculopathy, acute    Chondromalacia patellae    DJD (degenerative joint disease), ankle and foot    Frequent sinus infections    Hematuria    Iliotibial band syndrome    Joint pain, knee    Low back pain, episodic    Migraine without aura and without status migrainosus, not intractable    Moderate mixed hyperlipidemia not requiring statin therapy    Other specified hypothyroidism    PFS (patellofemoral syndrome)    Gallstones    Overweight with body mass index (BMI) of 26 to 26.9 in adult    Cough    Encephalopathy    Foot pain    Left otitis media    Sore throat    Viral URI     Past Medical History:   Diagnosis Date    Encounter for gynecological examination (general) (routine) without abnormal findings     Pap test, as part of routine gynecological examination    Other conditions influencing health status     Mammogram normal    Unspecified fracture of unspecified toe(s), initial encounter for closed fracture 2021    Fracture of distal phalanx of toe     Past Surgical History:   Procedure Laterality Date     SECTION, CLASSIC      x 2       reports that she has never smoked. She has never been exposed to tobacco smoke. She has never used smokeless tobacco. She reports current alcohol use. She reports that she does not use drugs.    /84   Pulse 82   Ht 1.676 m (5' 6\")   Wt 80.6 kg (177 lb 9.6 oz)   LMP 10/01/2020 (Approximate)   BMI 28.67 kg/m²     OBJECTIVE  Physical Exam/Neurological Exam   Constitutional: General appearance: no acute distress   Auscultation of Heart: Regular rate and rhythm, no murmurs, normal S1 and S2.   Carotid Arteries: Intact without any bruits.   Neck is supple.   No lymph adenopathy. "   Peripheral Vascular Exam: Pulses +2 and equal in all extremities. No swelling, varicosities, edema or tenderness to palpations.    Abdomen is soft, nondistended. No organomegaly.  Mental status: The patient was in no distress, alert, interactive and cooperative. Affect is appropriate.   Orientation: oriented to person, oriented to place and oriented to time.   Memory: recent memory intact and remote memory intact.   Attention: normal attention span and normal concentrating ability.   Language: normal comprehension and no difficulty naming common objects.   Fund of knowledge: Patient displays adequate knowledge of current events, adequate fund of knowledge regarding past history and adequate fund of knowledge regarding vocabulary.   Eyes: The ophthalmoscopic examination was normal. The fundi are visualized with normal disc margins and without.  Cranial nerve II: Visual fields full to confrontation.   Cranial nerves III, IV, and VI: Pupils round, equally reactive to light; no ptosis. EOMs intact. No nystagmus.   Cranial Nerve V: Facial sensation intact bilaterally.   Cranial nerve VII: Normal and symmetric facial strength.   Cranial nerve VIII: Hearing is intact bilaterally to finger rub / whisper.   Cranial nerves IX and X: Palate elevates symmetrically.   Cranial nerve XI: Shoulder shrug and neck rotation strength are intact.   Cranial nerve XII: Tongue midline with normal strength.   Motor: Motor exam was normal. Muscle bulk was normal in both upper and lower extremities. Muscle tone was normal in both upper and lower extremities. Muscle strength was 5/5 throughout. no abnormal or adventitious movements were present.   Deep Tendon Reflexes: left biceps 2+ , right biceps 2+, left triceps 2+, right triceps 2+, left brachioradialis 2+, right brachioradialis 2+, left patella 2+, right patella 2+, left ankle jerk 2+, right ankle jerk 2+   Plantar Reflex: Toes downgoing to plantar stimulation on the left. Toes  downgoing to plantar stimulation on the right.   Sensory Exam: Normal to light touch.   Coordination: There is no limb dystaxia and rapid alternating movements are intact.  Gait: Gait is normal without spasticity, ataxia or bradykinesia. Stance is stable with a negative Romberg.      ASSESSMENT/PLAN  Diagnoses and all orders for this visit:  Migraine without aura and without status migrainosus, not intractable  -     atenolol (Tenormin) 50 mg tablet; Take 1 tablet (50 mg) by mouth once daily.  -     ubrogepant (Ubrelvy) 100 mg tablet tablet; Take 1 tablet (100 mg) by mouth if needed (migraine).  Anxiety  -     busPIRone (Buspar) 15 mg tablet; Take 1 tablet (15 mg) by mouth 3 times a day.  Encephalopathy        Quoc Escobar MD  3/4/2024  1:36 PM

## 2024-03-14 PROBLEM — J06.9 VIRAL URI: Status: RESOLVED | Noted: 2023-10-23 | Resolved: 2024-03-14

## 2024-03-14 PROBLEM — R05.9 COUGH: Status: RESOLVED | Noted: 2023-10-23 | Resolved: 2024-03-14

## 2024-03-14 PROBLEM — H66.92 LEFT OTITIS MEDIA: Status: RESOLVED | Noted: 2023-10-23 | Resolved: 2024-03-14

## 2024-03-14 PROBLEM — J02.9 SORE THROAT: Status: RESOLVED | Noted: 2023-10-23 | Resolved: 2024-03-14

## 2024-03-28 DIAGNOSIS — Z12.11 COLON CANCER SCREENING: ICD-10-CM

## 2024-04-05 ENCOUNTER — HOSPITAL ENCOUNTER (OUTPATIENT)
Dept: RADIOLOGY | Facility: CLINIC | Age: 54
Discharge: HOME | End: 2024-04-05
Payer: COMMERCIAL

## 2024-04-05 VITALS — BODY MASS INDEX: 28.12 KG/M2 | WEIGHT: 175 LBS | HEIGHT: 66 IN

## 2024-04-05 DIAGNOSIS — Z12.31 SCREENING MAMMOGRAM FOR BREAST CANCER: ICD-10-CM

## 2024-04-05 PROCEDURE — 77067 SCR MAMMO BI INCL CAD: CPT | Performed by: RADIOLOGY

## 2024-04-05 PROCEDURE — 77063 BREAST TOMOSYNTHESIS BI: CPT | Performed by: RADIOLOGY

## 2024-04-05 PROCEDURE — 77067 SCR MAMMO BI INCL CAD: CPT

## 2024-04-05 ASSESSMENT — PROMIS GLOBAL HEALTH SCALE
RATE_SOCIAL_SATISFACTION: VERY GOOD
RATE_QUALITY_OF_LIFE: VERY GOOD
RATE_MENTAL_HEALTH: GOOD
CARRYOUT_SOCIAL_ACTIVITIES: GOOD
CARRYOUT_PHYSICAL_ACTIVITIES: COMPLETELY
RATE_AVERAGE_FATIGUE: SEVERE
RATE_GENERAL_HEALTH: GOOD
RATE_PHYSICAL_HEALTH: GOOD
RATE_AVERAGE_PAIN: 0
EMOTIONAL_PROBLEMS: SOMETIMES

## 2024-04-06 ENCOUNTER — OFFICE VISIT (OUTPATIENT)
Dept: PRIMARY CARE | Facility: CLINIC | Age: 54
End: 2024-04-06
Payer: COMMERCIAL

## 2024-04-06 VITALS
HEIGHT: 66 IN | BODY MASS INDEX: 28.61 KG/M2 | HEART RATE: 80 BPM | DIASTOLIC BLOOD PRESSURE: 62 MMHG | OXYGEN SATURATION: 97 % | WEIGHT: 178 LBS | TEMPERATURE: 96.8 F | RESPIRATION RATE: 16 BRPM | SYSTOLIC BLOOD PRESSURE: 104 MMHG

## 2024-04-06 DIAGNOSIS — E03.8 OTHER SPECIFIED HYPOTHYROIDISM: ICD-10-CM

## 2024-04-06 DIAGNOSIS — L23.9 ALLERGIC DERMATITIS: ICD-10-CM

## 2024-04-06 DIAGNOSIS — E78.2 MODERATE MIXED HYPERLIPIDEMIA NOT REQUIRING STATIN THERAPY: ICD-10-CM

## 2024-04-06 DIAGNOSIS — Z00.00 PHYSICAL EXAM: Primary | ICD-10-CM

## 2024-04-06 PROCEDURE — 1036F TOBACCO NON-USER: CPT | Performed by: FAMILY MEDICINE

## 2024-04-06 PROCEDURE — 99396 PREV VISIT EST AGE 40-64: CPT | Performed by: FAMILY MEDICINE

## 2024-04-06 RX ORDER — TRETINOIN 0.5 MG/G
CREAM TOPICAL
Qty: 135 G | Refills: 1 | Status: SHIPPED | OUTPATIENT
Start: 2024-04-06

## 2024-04-06 NOTE — PROGRESS NOTES
"Subjective   Patient ID: Sari Spaulding is a 53 y.o. female who presents for Annual Exam, Hypothyroidism, Weight Management (Metformin upsets stomach), and Fatigue.  Covid vax: x 2  Flu: n/a  Tdap: UTD  Shingles: UTD     CRC: has cologuard at home  Mammogram: 2024  Pap: 2023  Lmp: [id number]     HPI  Patient Active Problem List   Diagnosis    Allergic dermatitis    Anxiety    Cervical radiculopathy, acute    Chondromalacia patellae    DJD (degenerative joint disease), ankle and foot    Frequent sinus infections    Hematuria    Iliotibial band syndrome    Joint pain, knee    Low back pain, episodic    Migraine without aura and without status migrainosus, not intractable    Moderate mixed hyperlipidemia not requiring statin therapy    Other specified hypothyroidism    PFS (patellofemoral syndrome)    Gallstones    Overweight with body mass index (BMI) of 26 to 26.9 in adult    Encephalopathy    Foot pain       Past Surgical History:   Procedure Laterality Date     SECTION, CLASSIC      x 2       Review of Systems  This patient has  NO history of seizures/ CAD or CVA    NO history of recent Covid nor flu symptoms,  NO Fever nor chills,  NO Chest pain, shortness of breath nor paroxysmal nocturnal dyspnea,  NO Nausea, vomiting, nor diarrhea,  NO Hematochezia nor melena,  NO Dysuria, hematuria, nor new incontinence issues  NO new severe headaches nor neurological complaints, hx migraines not severe    NO new issues with anxiety nor depression nor new psychiatric complaints,  NO suicidal nor homicidal ideations.     OBJECTIVE:  /62   Pulse 80   Temp 36 °C (96.8 °F) (Temporal)   Resp 16   Ht 1.676 m (5' 6\")   Wt 80.7 kg (178 lb)   LMP 10/01/2020 (Approximate)   SpO2 97%   BMI 28.73 kg/m²      General:  alert, oriented, no acute distress.  No obvious skin rashes noted.   No gait disturbance noted.    Mood is pleasant,  no signs of emotional distress.   Not appearing intoxicated or altered.   No " voiced delusions,   Normal, appropriate behavior.    HEENT: Normocephalic, atraumatic,   Pupils round, reactive to light  Extraocular motions intact and wnl  Tympanic membranes normal    Neck: no nuchal rigidity  No masses palpable.  No carotid bruits.  No thyromegaly.    Respiratory: Equal breath sounds  No wheezes,    rales,    nor rhonchi  No respiratory distress.    Heart: Regular rate and rhythm, no    murmurs  no rubs/gallops    Abdomen: no masses palpable, nontender, no rebound nor guarding.    Extremities: NO cyanosis noted, no clubbing.   No edema noted.  2+dorsalis pedis pulses.    Normal-not antalgic, steady gait.    No visits with results within 3 Month(s) from this visit.   Latest known visit with results is:   Lab on 03/25/2023   Component Date Value Ref Range Status    WBC 03/25/2023 4.4  4.4 - 11.3 x10E9/L Final    RBC 03/25/2023 4.65  4.00 - 5.20 x10E12/L Final    Hemoglobin 03/25/2023 12.5  12.0 - 16.0 g/dL Final    Hematocrit 03/25/2023 38.8  36.0 - 46.0 % Final    MCV 03/25/2023 83  80 - 100 fL Final    MCHC 03/25/2023 32.2  32.0 - 36.0 g/dL Final    Platelets 03/25/2023 275  150 - 450 x10E9/L Final    RDW 03/25/2023 13.6  11.5 - 14.5 % Final    Neutrophils % 03/25/2023 55.3  40.0 - 80.0 % Final    Immature Granulocytes %, Automated 03/25/2023 0.5  0.0 - 0.9 % Final     Immature Granulocyte Count (IG) includes promyelocytes,    myelocytes and metamyelocytes but does not include bands.   Percent differential counts (%) should be interpreted in the   context of the absolute cell counts (cells/L).    Lymphocytes % 03/25/2023 33.1  13.0 - 44.0 % Final    Monocytes % 03/25/2023 7.0  2.0 - 10.0 % Final    Eosinophils % 03/25/2023 3.2  0.0 - 6.0 % Final    Basophils % 03/25/2023 0.9  0.0 - 2.0 % Final    Neutrophils Absolute 03/25/2023 2.44  1.20 - 7.70 x10E9/L Final    Lymphocytes Absolute 03/25/2023 1.46  1.20 - 4.80 x10E9/L Final    Monocytes Absolute 03/25/2023 0.31  0.10 - 1.00 x10E9/L Final     Eosinophils Absolute 03/25/2023 0.14  0.00 - 0.70 x10E9/L Final    Basophils Absolute 03/25/2023 0.04  0.00 - 0.10 x10E9/L Final    Glucose 03/25/2023 88  74 - 99 mg/dL Final    Sodium 03/25/2023 140  136 - 145 mmol/L Final    Potassium 03/25/2023 3.9  3.5 - 5.3 mmol/L Final    Chloride 03/25/2023 105  98 - 107 mmol/L Final    Bicarbonate 03/25/2023 27  21 - 32 mmol/L Final    Anion Gap 03/25/2023 12  10 - 20 mmol/L Final    Urea Nitrogen 03/25/2023 14  6 - 23 mg/dL Final    Creatinine 03/25/2023 0.68  0.50 - 1.05 mg/dL Final    GFR Female 03/25/2023 >90  >90 mL/min/1.73m2 Final     CALCULATIONS OF ESTIMATED GFR ARE PERFORMED   USING THE 2021 CKD-EPI STUDY REFIT EQUATION   WITHOUT THE RACE VARIABLE FOR THE IDMS-TRACEABLE   CREATININE METHODS.    https://jasn.asnjournals.org/content/early/2021/09/22/ASN.3372824383    Calcium 03/25/2023 9.0  8.6 - 10.3 mg/dL Final    Albumin 03/25/2023 4.2  3.4 - 5.0 g/dL Final    Alkaline Phosphatase 03/25/2023 53  33 - 110 U/L Final    Total Protein 03/25/2023 6.8  6.4 - 8.2 g/dL Final    AST 03/25/2023 14  9 - 39 U/L Final    Total Bilirubin 03/25/2023 0.7  0.0 - 1.2 mg/dL Final    ALT (SGPT) 03/25/2023 11  7 - 45 U/L Final     Patients treated with Sulfasalazine may generate    falsely decreased results for ALT.    Hemoglobin A1C 03/25/2023 5.2  % Final         Diagnosis of Diabetes-Adults   Non-Diabetic: < or = 5.6%   Increased risk for developing diabetes: 5.7-6.4%   Diagnostic of diabetes: > or = 6.5%  .       Monitoring of Diabetes                Age (y)     Therapeutic Goal (%)   Adults:          >18           <7.0   Pediatrics:    13-18           <7.5                   7-12           <8.0                   0- 6            7.5-8.5   American Diabetes Association. Diabetes Care 33(S1), Jan 2010.    Estimated Average Glucose 03/25/2023 103  MG/DL Final    Cholesterol 03/25/2023 197  0 - 199 mg/dL Final    .      AGE      DESIRABLE   BORDERLINE HIGH   HIGH     0-19 Y     0 - 169        170 - 199     >/= 200    20-24 Y     0 - 189       190 - 224     >/= 225         >24 Y     0 - 199       200 - 239     >/= 240   **All ranges are based on fasting samples. Specific   therapeutic targets will vary based on patient-specific   cardiac risk.  .   Pediatric guidelines reference:Pediatrics 2011, 128(S5).   Adult guidelines reference: NCEP ATPIII Guidelines,     RAOUL 2001, 258:2486-97  .   Venipuncture immediately after or during the    administration of Metamizole may lead to falsely   low results. Testing should be performed immediately   prior to Metamizole dosing.    HDL 03/25/2023 89.5  mg/dL Final    .      AGE      VERY LOW   LOW     NORMAL    HIGH       0-19 Y       < 35   < 40     40-45     ----    20-24 Y       ----   < 40       >45     ----      >24 Y       ----   < 40     40-60      >60  .    Cholesterol/HDL Ratio 03/25/2023 2.2   Final    REF VALUES  DESIRABLE  < 3.4  HIGH RISK  > 5.0    LDL 03/25/2023 99  0 - 99 mg/dL Final    .                           NEAR      BORD      AGE      DESIRABLE  OPTIMAL    HIGH     HIGH     VERY HIGH     0-19 Y     0 - 109     ---    110-129   >/= 130     ----    20-24 Y     0 - 119     ---    120-159   >/= 160     ----      >24 Y     0 -  99   100-129  130-159   160-189     >/=190  .    VLDL 03/25/2023 8  0 - 40 mg/dL Final    Triglycerides 03/25/2023 42  0 - 149 mg/dL Final    .      AGE      DESIRABLE   BORDERLINE HIGH   HIGH     VERY HIGH   0 D-90 D    19 - 174         ----         ----        ----  91 D- 9 Y     0 -  74        75 -  99     >/= 100      ----    10-19 Y     0 -  89        90 - 129     >/= 130      ----    20-24 Y     0 - 114       115 - 149     >/= 150      ----         >24 Y     0 - 149       150 - 199    200- 499    >/= 500  .   Venipuncture immediately after or during the    administration of Metamizole may lead to falsely   low results. Testing should be performed immediately   prior to Metamizole dosing.    T4, Total 03/25/2023  6.6  4.5 - 11.1 ug/dL Final    T3 Uptake 03/25/2023 37  24 - 41 % Final    Free Thyroxine Index 03/25/2023 2.4  1.6 - 4.7 Final    TSH 03/25/2023 1.80  0.44 - 3.98 mIU/L Final     TSH testing is performed using different testing    methodology at [facility] than at other    [facility]. Direct result comparisons should    only be made within the same method.    Ferritin 03/25/2023 53  8 - 150 ug/L Final    Vitamin B-12 03/25/2023 461  211 - 911 pg/mL Final    Vit D, 1,25-Dihydroxy 03/25/2023 32.5  19.9 - 79.3 pg/mL Final    INTERPRETIVE INFORMATION: Vitamin D, 1,25-Dihydroxy  This test is primarily indicated during patient evaluation for   hypercalcemia and renal failure. A normal result does not rule out   Vitamin D deficiency. The recommended test for diagnosing Vitamin   D deficiency is Vitamin D 25-hydroxy.  Performed By: [facility]  [address] [place] [id number]  : Joshua Pacheco MD, PhD    Folate 03/25/2023 17.4  >5.0 ng/mL Final    Low           <3.4  Borderline 3.4-5.0  Normal        >5.0  .   Patients receiving more than 5 mg/day of biotin may have interference   in test results. A sample should be taken no sooner than eight hours   after previous dose. Contact the testing laboratory for additional   information.         Assessment/Plan     Problem List Items Addressed This Visit       Allergic dermatitis    Relevant Medications    tretinoin (Retin-A) 0.05 % cream    Other Relevant Orders    CBC and Auto Differential    Comprehensive Metabolic Panel    Hemoglobin A1C    Lipid Panel    Thyroid Stimulating Hormone    Thyroxine, Free    Moderate mixed hyperlipidemia not requiring statin therapy    Relevant Orders    CBC and Auto Differential    Comprehensive Metabolic Panel    Hemoglobin A1C    Lipid Panel    Thyroid Stimulating Hormone    Thyroxine, Free    Other specified hypothyroidism    Relevant Orders    CBC and Auto Differential     "Comprehensive Metabolic Panel    Hemoglobin A1C    Lipid Panel    Thyroid Stimulating Hormone    Thyroxine, Free     Diet-exercise changes dw pt  Will try metformin 1/2 at bedtime  Mood is \"ok\"  2019 since menses  No hi/si  Sees dr servin for migraines  Will email if wants ozempic    Follow up at next scheduled visit 6-12mo  Labs due  The patient is aware that results will be forthcoming of ALL planned labs and or tests. If no results are received on my chart or by letter within 1 - 3 weeks, the patient is aware they need to call to obtain results, as this is not usual. Also, if any new conditions arise, or current condition worsens, it is understood that sooner appointment should be made or urgent care/convenient care or emergency room treatment should be sought depending on severity. Otherwise follow up for recheck at regular intervals as we have discussed, at least yearly.          "

## 2024-04-06 NOTE — PROGRESS NOTES
Covid vax: x 2  Flu: n/a  Tdap: UTD  Shingles: UTD    CRC: has cologuard at home  Mammogram: 4/2024  Pap: 4/2023  Lmp: [id number]     room air

## 2024-04-23 ENCOUNTER — TELEPHONE (OUTPATIENT)
Dept: PRIMARY CARE | Facility: CLINIC | Age: 54
End: 2024-04-23

## 2024-04-23 NOTE — TELEPHONE ENCOUNTER
Prior authorization approved for Tretinoin 0.05% cream. LVM to patient to notify of approval. Patient is to call pharmacy to notify of prior authorization approval. If additional assistance is needed, advised patient to call [facility] at [phone].

## 2024-05-20 ENCOUNTER — LAB (OUTPATIENT)
Dept: LAB | Facility: LAB | Age: 54
End: 2024-05-20
Payer: COMMERCIAL

## 2024-05-20 DIAGNOSIS — L23.9 ALLERGIC DERMATITIS: ICD-10-CM

## 2024-05-20 DIAGNOSIS — E78.2 MODERATE MIXED HYPERLIPIDEMIA NOT REQUIRING STATIN THERAPY: ICD-10-CM

## 2024-05-20 DIAGNOSIS — E03.8 OTHER SPECIFIED HYPOTHYROIDISM: ICD-10-CM

## 2024-05-20 LAB
ALBUMIN SERPL BCP-MCNC: 4.2 G/DL (ref 3.4–5)
ALP SERPL-CCNC: 54 U/L (ref 33–110)
ALT SERPL W P-5'-P-CCNC: 15 U/L (ref 7–45)
ANION GAP SERPL CALC-SCNC: 10 MMOL/L (ref 10–20)
AST SERPL W P-5'-P-CCNC: 17 U/L (ref 9–39)
BASOPHILS # BLD AUTO: 0.06 X10*3/UL (ref 0–0.1)
BASOPHILS NFR BLD AUTO: 0.8 %
BILIRUB SERPL-MCNC: 0.6 MG/DL (ref 0–1.2)
BUN SERPL-MCNC: 25 MG/DL (ref 6–23)
CALCIUM SERPL-MCNC: 8.7 MG/DL (ref 8.6–10.3)
CHLORIDE SERPL-SCNC: 108 MMOL/L (ref 98–107)
CHOLEST SERPL-MCNC: 208 MG/DL (ref 0–199)
CHOLESTEROL/HDL RATIO: 2.7
CO2 SERPL-SCNC: 27 MMOL/L (ref 21–32)
CREAT SERPL-MCNC: 0.71 MG/DL (ref 0.5–1.05)
EGFRCR SERPLBLD CKD-EPI 2021: >90 ML/MIN/1.73M*2
EOSINOPHIL # BLD AUTO: 0.13 X10*3/UL (ref 0–0.7)
EOSINOPHIL NFR BLD AUTO: 1.8 %
ERYTHROCYTE [DISTWIDTH] IN BLOOD BY AUTOMATED COUNT: 14.3 % (ref 11.5–14.5)
GLUCOSE SERPL-MCNC: 98 MG/DL (ref 74–99)
HCT VFR BLD AUTO: 37.9 % (ref 36–46)
HDLC SERPL-MCNC: 78.4 MG/DL
HGB BLD-MCNC: 12.4 G/DL (ref 12–16)
IMM GRANULOCYTES # BLD AUTO: 0.01 X10*3/UL (ref 0–0.7)
IMM GRANULOCYTES NFR BLD AUTO: 0.1 % (ref 0–0.9)
LDLC SERPL CALC-MCNC: 100 MG/DL
LYMPHOCYTES # BLD AUTO: 2.47 X10*3/UL (ref 1.2–4.8)
LYMPHOCYTES NFR BLD AUTO: 33.6 %
MCH RBC QN AUTO: 27.4 PG (ref 26–34)
MCHC RBC AUTO-ENTMCNC: 32.7 G/DL (ref 32–36)
MCV RBC AUTO: 84 FL (ref 80–100)
MONOCYTES # BLD AUTO: 0.5 X10*3/UL (ref 0.1–1)
MONOCYTES NFR BLD AUTO: 6.8 %
NEUTROPHILS # BLD AUTO: 4.19 X10*3/UL (ref 1.2–7.7)
NEUTROPHILS NFR BLD AUTO: 56.9 %
NON HDL CHOLESTEROL: 130 MG/DL (ref 0–149)
NRBC BLD-RTO: 0 /100 WBCS (ref 0–0)
PLATELET # BLD AUTO: 273 X10*3/UL (ref 150–450)
POTASSIUM SERPL-SCNC: 4.1 MMOL/L (ref 3.5–5.3)
PROT SERPL-MCNC: 6.4 G/DL (ref 6.4–8.2)
RBC # BLD AUTO: 4.52 X10*6/UL (ref 4–5.2)
SODIUM SERPL-SCNC: 141 MMOL/L (ref 136–145)
T4 FREE SERPL-MCNC: 0.82 NG/DL (ref 0.61–1.12)
TRIGL SERPL-MCNC: 146 MG/DL (ref 0–149)
TSH SERPL-ACNC: 1.37 MIU/L (ref 0.44–3.98)
VLDL: 29 MG/DL (ref 0–40)
WBC # BLD AUTO: 7.4 X10*3/UL (ref 4.4–11.3)

## 2024-05-20 PROCEDURE — 83036 HEMOGLOBIN GLYCOSYLATED A1C: CPT

## 2024-05-20 PROCEDURE — 80053 COMPREHEN METABOLIC PANEL: CPT

## 2024-05-20 PROCEDURE — 84439 ASSAY OF FREE THYROXINE: CPT

## 2024-05-20 PROCEDURE — 36415 COLL VENOUS BLD VENIPUNCTURE: CPT

## 2024-05-20 PROCEDURE — 80061 LIPID PANEL: CPT

## 2024-05-20 PROCEDURE — 84443 ASSAY THYROID STIM HORMONE: CPT

## 2024-05-20 PROCEDURE — 85025 COMPLETE CBC W/AUTO DIFF WBC: CPT

## 2024-05-21 LAB
EST. AVERAGE GLUCOSE BLD GHB EST-MCNC: 108 MG/DL
HBA1C MFR BLD: 5.4 %

## 2024-06-06 DIAGNOSIS — E66.3 OVERWEIGHT WITH BODY MASS INDEX (BMI) OF 26 TO 26.9 IN ADULT: ICD-10-CM

## 2024-06-06 RX ORDER — METFORMIN HYDROCHLORIDE 500 MG/1
500 TABLET ORAL
Qty: 90 TABLET | Refills: 0 | Status: SHIPPED | OUTPATIENT
Start: 2024-06-06

## 2024-08-26 ENCOUNTER — APPOINTMENT (OUTPATIENT)
Dept: NEUROLOGY | Facility: CLINIC | Age: 54
End: 2024-08-26
Payer: COMMERCIAL

## 2024-08-26 VITALS
BODY MASS INDEX: 28.51 KG/M2 | WEIGHT: 177.4 LBS | HEIGHT: 66 IN | HEART RATE: 80 BPM | SYSTOLIC BLOOD PRESSURE: 90 MMHG | DIASTOLIC BLOOD PRESSURE: 66 MMHG

## 2024-08-26 DIAGNOSIS — G43.009 MIGRAINE WITHOUT AURA AND WITHOUT STATUS MIGRAINOSUS, NOT INTRACTABLE: Primary | ICD-10-CM

## 2024-08-26 DIAGNOSIS — M54.12 CERVICAL RADICULOPATHY, ACUTE: ICD-10-CM

## 2024-08-26 DIAGNOSIS — F41.9 ANXIETY: ICD-10-CM

## 2024-08-26 PROCEDURE — 1036F TOBACCO NON-USER: CPT | Performed by: PSYCHIATRY & NEUROLOGY

## 2024-08-26 PROCEDURE — 3008F BODY MASS INDEX DOCD: CPT | Performed by: PSYCHIATRY & NEUROLOGY

## 2024-08-26 PROCEDURE — 99214 OFFICE O/P EST MOD 30 MIN: CPT | Performed by: PSYCHIATRY & NEUROLOGY

## 2024-08-26 RX ORDER — CEPHALEXIN 500 MG/1
CAPSULE ORAL
COMMUNITY
Start: 2024-05-08

## 2024-08-26 RX ORDER — MUPIROCIN 20 MG/G
OINTMENT TOPICAL
COMMUNITY
Start: 2024-05-08

## 2024-08-26 ASSESSMENT — PATIENT HEALTH QUESTIONNAIRE - PHQ9
2. FEELING DOWN, DEPRESSED OR HOPELESS: NOT AT ALL
1. LITTLE INTEREST OR PLEASURE IN DOING THINGS: NOT AT ALL
SUM OF ALL RESPONSES TO PHQ9 QUESTIONS 1 & 2: 0

## 2024-08-26 ASSESSMENT — ENCOUNTER SYMPTOMS
NUMBNESS: 1
DIZZINESS: 1
HEADACHES: 1

## 2024-08-26 NOTE — PROGRESS NOTES
Sari Morser  53 y.o.       SUBJECTIVE    HPI   Sari is a 53-year-old young lady who was seen today for follow-up of her recurrent migraine headache with history of generalized anxiety disorder and multiple other medical issues including hypothyroidism and diabetes.  Since last seen she has done very well on Ubrelvy as needed and she got her ear pierced and had acute puncture which does seem to be working well.  I would like to taper her atenolol and keep a headache diary and food into the frequency get worse then may restart back on her medication.  Today her physical and neurological exam is normal advised her to take Ubrelvy as needed and repeat in 2 hours if the headache persist    I did review the medication list.  I have discussed the trigger factors for the headaches importance of sleep hygiene and hydration which she understood.          Due to technical limitations of voice recognition and human error, this note may not accurately reflect the care of the patient.    Review of Systems   Neurological:  Positive for dizziness, numbness and headaches.        Patient Active Problem List   Diagnosis    Allergic dermatitis    Anxiety    Cervical radiculopathy, acute    Chondromalacia patellae    DJD (degenerative joint disease), ankle and foot    Frequent sinus infections    Hematuria    Iliotibial band syndrome    Joint pain, knee    Low back pain, episodic    Migraine without aura and without status migrainosus, not intractable    Moderate mixed hyperlipidemia not requiring statin therapy    Other specified hypothyroidism    PFS (patellofemoral syndrome)    Gallstones    Overweight with body mass index (BMI) of 26 to 26.9 in adult    Encephalopathy    Foot pain     Past Medical History:   Diagnosis Date    History of mammogram 2024    cat 1    Pap test, as part of routine gynecological examination 2023    wnl, hpv neg     Past Surgical History:   Procedure Laterality Date     SECTION, CLASSIC  "     x 2       reports that she has never smoked. She has never been exposed to tobacco smoke. She has never used smokeless tobacco. She reports current alcohol use. She reports that she does not use drugs.    BP 90/66 (BP Location: Left arm, Patient Position: Sitting, BP Cuff Size: Large adult)   Pulse 80   Ht 1.676 m (5' 6\")   Wt 80.5 kg (177 lb 6.4 oz)   LMP 10/01/2020 (Approximate)   BMI 28.63 kg/m²     OBJECTIVE  Physical Exam/Neurological Exam   Constitutional: General appearance: no acute distress   Auscultation of Heart: Regular rate and rhythm, no murmurs, normal S1 and S2.   Carotid Arteries: Intact without any bruits.   Neck is supple.   No lymph adenopathy.   Peripheral Vascular Exam: Pulses +2 and equal in all extremities. No swelling, varicosities, edema or tenderness to palpations.    Abdomen is soft, nondistended. No organomegaly.  Mental status: The patient was in no distress, alert, interactive and cooperative. Affect is appropriate.   Orientation: oriented to person, oriented to place and oriented to time.   Memory: recent memory intact and remote memory intact.   Attention: normal attention span and normal concentrating ability.   Language: normal comprehension and no difficulty naming common objects.   Fund of knowledge: Patient displays adequate knowledge of current events, adequate fund of knowledge regarding past history and adequate fund of knowledge regarding vocabulary.   Eyes: The ophthalmoscopic examination was normal. The fundi are visualized with normal disc margins and without.  Cranial nerve II: Visual fields full to confrontation.   Cranial nerves III, IV, and VI: Pupils round, equally reactive to light; no ptosis. EOMs intact. No nystagmus.   Cranial Nerve V: Facial sensation intact bilaterally.   Cranial nerve VII: Normal and symmetric facial strength.   Cranial nerve VIII: Hearing is intact bilaterally to finger rub / whisper.   Cranial nerves IX and X: Palate elevates " symmetrically.   Cranial nerve XI: Shoulder shrug and neck rotation strength are intact.   Cranial nerve XII: Tongue midline with normal strength.   Motor: Motor exam was normal. Muscle bulk was normal in both upper and lower extremities. Muscle tone was normal in both upper and lower extremities. Muscle strength was 5/5 throughout. no abnormal or adventitious movements were present.   Deep Tendon Reflexes: left biceps 2+ , right biceps 2+, left triceps 2+, right triceps 2+, left brachioradialis 2+, right brachioradialis 2+, left patella 2+, right patella 2+, left ankle jerk 2+, right ankle jerk 2+   Plantar Reflex: Toes downgoing to plantar stimulation on the left. Toes downgoing to plantar stimulation on the right.   Sensory Exam: Normal to light touch.   Coordination: There is no limb dystaxia and rapid alternating movements are intact.  Gait: Gait is normal without spasticity, ataxia or bradykinesia. Stance is stable with a negative Romberg.      ASSESSMENT/PLAN  Diagnoses and all orders for this visit:  Migraine without aura and without status migrainosus, not intractable  -     ubrogepant (Ubrelvy) 100 mg tablet tablet; Take 1 tablet (100 mg) by mouth if needed (migraine).  Anxiety  Cervical radiculopathy, acute        Quoc Escobar MD  8/26/2024  9:52 AM

## 2024-09-03 ENCOUNTER — OFFICE VISIT (OUTPATIENT)
Dept: ORTHOPEDIC SURGERY | Facility: CLINIC | Age: 54
End: 2024-09-03
Payer: COMMERCIAL

## 2024-09-03 DIAGNOSIS — M25.511 RIGHT SHOULDER PAIN, UNSPECIFIED CHRONICITY: ICD-10-CM

## 2024-09-03 DIAGNOSIS — M75.21 BICEPS TENDONITIS, RIGHT: ICD-10-CM

## 2024-09-03 DIAGNOSIS — M77.11 LATERAL EPICONDYLITIS OF RIGHT ELBOW: ICD-10-CM

## 2024-09-03 DIAGNOSIS — M77.8 TRICEPS TENDONITIS: ICD-10-CM

## 2024-09-03 DIAGNOSIS — M25.521 RIGHT ELBOW PAIN: ICD-10-CM

## 2024-09-03 PROCEDURE — 99213 OFFICE O/P EST LOW 20 MIN: CPT | Performed by: STUDENT IN AN ORGANIZED HEALTH CARE EDUCATION/TRAINING PROGRAM

## 2024-09-03 RX ORDER — NAPROXEN 500 MG/1
500 TABLET ORAL
Qty: 28 TABLET | Refills: 1 | Status: SHIPPED | OUTPATIENT
Start: 2024-09-03 | End: 2024-10-01

## 2024-09-03 NOTE — PROGRESS NOTES
Acute Injury New Patient Visit    HPI: Sari is a 53 y.o.female who presents today with new complaints of right elbow pain.  She is right-hand dominant.  She states that in June she started going to the gym and began noticing some lateral elbow pain.  She used a forearm strap, which helped improve this pain, but over the past 2 to 3 weeks she has been doing a lot of home projects including tearing up some floors with a crowbar, which led to worsening pain in the elbow, but yesterday she was scraping out some floor using a different motion and began having some anterior arm pain.  She denies feeling any pop.  She denies any swelling and bruising.  She denies any numbness and tingling.  She has been trying ice, forearm strap, and rest.  She denies any falls or injuries.    Plan: For this right lateral epicondylitis, right triceps tendinitis, and right biceps tendinitis, we will start her in physical therapy, return to the forearm strap, start her on naproxen, continue with compression, ice, rest, activity modification, follow-up in 3 to 4 weeks.      Assessment:   Problem List Items Addressed This Visit    None  Visit Diagnoses       Right elbow pain        Relevant Orders    Referral to Physical Therapy    Right shoulder pain, unspecified chronicity        Relevant Orders    Referral to Physical Therapy    Lateral epicondylitis of right elbow        Relevant Orders    Referral to Physical Therapy    Triceps tendonitis        Relevant Orders    Referral to Physical Therapy    Biceps tendonitis, right        Relevant Orders    Referral to Physical Therapy            Diagnostics: Reviewed all relevant imaging including x-ray, MRI, CT, and US.      Procedure:  Procedures    Physical Exam:  GENERAL:  No obvious acute distress.  NEURO:  Distally neurovascularly intact.  Sensation intact to light touch.  Extremity: Exam of the right elbow:  Skin is intact with no signs of infection;  No swelling or bruising;  No erythema  or warmth;  TENDER over the distal triceps and the proximal biceps long head tendon;  TENDER overlying the lateral epicondyle;  No tenderness overlying the medial epicondyle;  No pain with resisted extension at the wrist;  No pain with supination;  No pain with flexion;  Negative hook test;    Orders Placed This Encounter    Referral to Physical Therapy      At the conclusion of the visit there were no further questions by the patient/family regarding their plan of care.  Patient was instructed to call or return with any issues, questions, or concerns regarding their injury and/or treatment plan described above.     09/03/24 at 4:33 PM - Stef Higgins,     Office: [phone]    This note was prepared using voice recognition software.  The details of this note are correct and have been reviewed, and corrected to the best of my ability.  Some grammatical errors may persist related to the Dragon software.

## 2024-09-05 ENCOUNTER — EVALUATION (OUTPATIENT)
Dept: PHYSICAL THERAPY | Facility: HOSPITAL | Age: 54
End: 2024-09-05
Payer: COMMERCIAL

## 2024-09-05 DIAGNOSIS — M25.521 RIGHT ELBOW PAIN: ICD-10-CM

## 2024-09-05 DIAGNOSIS — M77.11 LATERAL EPICONDYLITIS OF RIGHT ELBOW: ICD-10-CM

## 2024-09-05 DIAGNOSIS — M75.21 BICEPS TENDONITIS, RIGHT: ICD-10-CM

## 2024-09-05 DIAGNOSIS — M25.511 RIGHT SHOULDER PAIN, UNSPECIFIED CHRONICITY: ICD-10-CM

## 2024-09-05 DIAGNOSIS — M25.511 ACUTE PAIN OF RIGHT SHOULDER: Primary | ICD-10-CM

## 2024-09-05 DIAGNOSIS — M77.8 TRICEPS TENDONITIS: ICD-10-CM

## 2024-09-05 PROCEDURE — 97110 THERAPEUTIC EXERCISES: CPT | Mod: GP

## 2024-09-05 PROCEDURE — 97161 PT EVAL LOW COMPLEX 20 MIN: CPT | Mod: GP

## 2024-09-05 ASSESSMENT — ENCOUNTER SYMPTOMS
OCCASIONAL FEELINGS OF UNSTEADINESS: 0
LOSS OF SENSATION IN FEET: 0
DEPRESSION: 0

## 2024-09-05 ASSESSMENT — PAIN SCALES - GENERAL
PAINLEVEL_OUTOF10: 2
PAINLEVEL_OUTOF10: 2

## 2024-09-05 ASSESSMENT — PAIN DESCRIPTION - DESCRIPTORS
DESCRIPTORS: ACHING
DESCRIPTORS: ACHING

## 2024-09-05 ASSESSMENT — PAIN - FUNCTIONAL ASSESSMENT: PAIN_FUNCTIONAL_ASSESSMENT: 0-10

## 2024-09-05 NOTE — PROGRESS NOTES
Physical Therapy Evaluation and Treatment      Patient Name: Sari Spaulding  MRN: [id number]  Today's Date: 9/5/2024    Time Entry:   Time Calculation  Start Time: 0200  Stop Time: 0239  Time Calculation (min): 39 min  PT Evaluation Time Entry  PT Evaluation (Low) Time Entry: 28  PT Therapeutic Procedures Time Entry  Therapeutic Exercise Time Entry: 11                 INS MMO SU[employer] 60V PT OT COPAY 0 DED 0 COVERAGE 100 OOP [phone] NO AUTH REQ REF  Visit #1     Assessment:    Sari Spaulding is a 53 y.o.  female presenting with c/o R elbow and shoulder pain consistent with lateral epicondylitis, triceps and biceps tendinitis and mm spasm. Upon examination patient demonstrates min impaired cervical/shoulder/elbow AROM, decreased elbow and shoulder strength, + medial and lateral epicondylitis testing and TTP and TrP in R wrist extensors,  triceps, biceps mm belly, deltoid, biceps tendon, teres major/minor, infraspinatus, LS, and MT/rhomboids. . Functional limitations and participations restrictions include pushing, pulling, lifting items, sleeping, working out and doing housework.  The clinical presentation of this patient is stable and their history and examination findings are consistent with a low complexity evaluation with good rehab potential. Pt will benefit from skilled PT intervention 1 x/week for 8 weeks to address limitations listed above and achieve desired goals.      Plan:  OP PT Plan  Treatment/Interventions: Biofeedback, Blood flow restriction therapy, Cryotherapy, Dry needling, Education/ Instruction, Electrical stimulation, Hot pack, Manual therapy, Mechanical traction, Neuromuscular re-education, Self care/ home management, Taping techniques, Therapeutic activities, Therapeutic exercises, Ultrasound, Vasopneumatic device  PT Plan: Skilled PT  PT Frequency: 1 time per week  Duration: 8 weeks  Onset Date: 06/01/24  Number of Treatments Authorized: MMO SU[employer] 60V PT OT COPAY 0 DED 0  COVERAGE 100 OOP [phone]) [phone]) NO AUTH REQ REF  Rehab Potential: Good  Plan of Care Agreement: Patient    Current Problem:   1. Acute pain of right shoulder        2. Right elbow pain  Referral to Physical Therapy      3. Right shoulder pain, unspecified chronicity  Referral to Physical Therapy      4. Lateral epicondylitis of right elbow  Referral to Physical Therapy      5. Triceps tendonitis  Referral to Physical Therapy      6. Biceps tendonitis, right  Referral to Physical Therapy          Subjective    General:  R elbow and shoulder pain that started in June after she started going to the gym and home projects.     Elbow strap has been helpful. Had some sharp stabbing pain that happened over the weekend in the biceps and just feels sore and like a knot now     Did feel swollen and has been taking naproxen   General  Reason for Referral: Right elbow pain (M25.521), Right shoulder pain, unspecified chronicity (M25.511), Lateral epicondylitis of right elbow (M77.11), Triceps tendonitis (M77.8), Biceps tendonitis, right (M75.21)  Referred By: Stef Higgins,     Pain (0-10)         Location: R elbow (lateral elbow into tricep)- achy         Best: 1/10 ice, elbow strap         Worst: 9/10 pulling movement, lifting items, sleeping         Current: 1/10          Location: R Shoulder        Best: 1/10 ice         Worst: 9/10 pushing movements, lifting items, sleeping         Current: 2/10           Imaging: denies   Numbness/Tingling: denies  Denies feelings of instability.     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      Goals for PT: get back to normal ROM and reduce pain.     Precautions:  Precautions  STEADI Fall Risk Score (The score of 4 or more indicates an increased risk of falling): 0  Precautions Comment: migraines, hypothyroidism  Vital Signs:     Pain:  Pain Assessment  Pain Assessment: 0-10  0-10 (Numeric) Pain Score: 2  Pain Type: Acute pain  Pain Location: Shoulder  Pain Orientation: Anterior, Right  Pain Descriptors: Aching  Multiple Pain Sites: Two  Pain 2  Pain Score 2: 2  Pain Type 2: Chronic pain  Pain Location 2: Elbow  Pain Orientation 2: Outer  Pain Descriptors 2: Aching  Home Living:  Home Living  Home Living Comment: NA  Prior Level of Function:  Prior Function Per Pt/Caregiver Report  Level of [place]: Independent with ADLs and functional transfers    Objective   Posture: R>L rounded shoulder, relaxed posture     AROM:  - Cervical Spine Flexion           WNL   - Cervical Spine Extension       WNL   - Cervical Spine Rotation          WNL B   - Cervical Spine Sidebending   L     30 with L sided pulling sensation      R 35  - Shoulder Flexion                    L     155  R 145 with slight pulling  - Shoulder Extension               L     wnl      R wnl with discomfort   - Shoulder ABD                       L    wnl       R 128 with discomfort   - Shoulder ER                         WNL B   - Shoulder IR                           L   t11       R t11  - Elbow flexion                        L    wnl      R wnl   - Elbow extension                   L     0     R lacking 5 with AROM / able to achieve 0 with PROM   Wrist ROM WNL with tightness during wrist flexion       Strength   ROM:  Cervical MMT WNL   - Shoulder Flexion                    L   5       R4 with pain   - Shoulder Extension               L     5     R5  - Shoulder ABD                       L     5     R 4 with pain   - Shoulder ER                          L      4+    R4+  - Shoulder IR                           L      4+    R4+  - Elbow  flexion                        L      4+    R 4+  - Elbow extension                   L     4+     R 4+  - Forearm supination               L    5      R5  - forearm pronation                 L   5       R5  - wrist flexion                         L    5      R5  - wrist extension                    L     5     R5  - wrist radial deviation           L     5     R5  - wrist ulnar deviation            L     5     R5      Postural muscle strength:   - LT                                     L   4-       R 3+with discomfort   - MT                                    L   4+       R 4 with discomfort   - Rhomboids                       L    4+      R4  - Latissimus Dorsi               L    4+      R 4+ with discomfort     Special Tests:  Shoulder      Yergason's test (biceps tendinitis) (-) B   Elbow     macedo test (lateral epicondylitis)-  + R   / (-) L      medial epicondylitis test -  + R / (-) L     Palpation: TTP and TrP in R wrist extensors,  triceps, biceps mm belly, deltoid, biceps tendon, teres major/minor, infraspinatus, LS, and MT/rhomboids.   Observation: min bruising posterior elbow     Outcome Measures:  Other Measures  Disability of Arm Shoulder Hand (DASH): 24     Treatments:  Access Code: [email or web address]  URL: [email or web address]  Date: 09/05/2024  Prepared by: Saima Zamarripa    Exercises  - Seated Wrist Extension with Dumbbell  - 1 x daily - 7 x weekly - 2 sets - 15 reps  - Seated Wrist Flexion with Dumbbell  - 1 x daily - 7 x weekly - 2 sets - 15 reps  - Wrist Flexor Stretch with Elbow Flexed: Progression From Elbow at Side  - 1 x daily - 7 x weekly - 2 sets - 30-60sec hold  - Wrist Extensor Stretch With Elbow Flexed: Progression From Elbow at Side  - 1 x daily - 7 x weekly - 2 sets - 30-60sec hold  - Isometric Tricep Extension   - 1 x daily - 7 x weekly - 3 sets - 10 reps - 5sec hold  - Prone Scapular Slide with Shoulder Extension  - 1 x daily - 7 x weekly - 3 sets - 10  reps    EDUCATION:   Ed on ice for pain and inflammation reduction and heat for promoting muscle relaxation and increasing blood flow. Pt educated on using ice no longer than 10-15 minutes for optimal vasoconstriction effects and pain relief.    Goals:  STGs: 4 weeks     Pt will demonstrate symmetrical AROM WNL without pain for unlimited ability to perform home household/recreational/occupational tasks and to allow for correct mechanics with functional mobility.    Pt will demonstrate good posture with <2 cues for correction during 45 minute treatment session in order to enhance body mechanics with ADLs, functional mobility, and recreational/occupational duties.    Pt will demonstrate independence and report compliance with HEP to facilitate independent rehab program upon discharge.    LTGs: 8 weeks     Pt will demonstrate increased strength in deficient muscles by 1/2 MMT grade to assist with improved posture and reduced shoulder pain.     Pt will demonstrate (-) mill's test to demonstrate improved tissue mobility to allow for pulling items without elbow pain.     Pt will demonstrate ability to lift 10# item with proper form without shoulder pain to allow for return to ADLs.     Pt will report improved QuickDASH score by 8 points (MCID) to demonstrate improved functional mobility and improved QOL. Baseline: 24

## 2024-09-16 ENCOUNTER — TELEPHONE (OUTPATIENT)
Dept: PHYSICAL THERAPY | Facility: HOSPITAL | Age: 54
End: 2024-09-16
Payer: COMMERCIAL

## 2024-09-18 ENCOUNTER — OFFICE VISIT (OUTPATIENT)
Dept: PRIMARY CARE | Facility: CLINIC | Age: 54
End: 2024-09-18
Payer: COMMERCIAL

## 2024-09-18 VITALS
HEIGHT: 66 IN | DIASTOLIC BLOOD PRESSURE: 86 MMHG | SYSTOLIC BLOOD PRESSURE: 122 MMHG | HEART RATE: 86 BPM | WEIGHT: 177 LBS | TEMPERATURE: 97.1 F | BODY MASS INDEX: 28.45 KG/M2 | RESPIRATION RATE: 18 BRPM | OXYGEN SATURATION: 99 %

## 2024-09-18 DIAGNOSIS — H60.12 CELLULITIS OF ANTIHELIX OF LEFT EAR: Primary | ICD-10-CM

## 2024-09-18 PROCEDURE — 3008F BODY MASS INDEX DOCD: CPT | Performed by: PHYSICIAN ASSISTANT

## 2024-09-18 PROCEDURE — 99213 OFFICE O/P EST LOW 20 MIN: CPT | Performed by: PHYSICIAN ASSISTANT

## 2024-09-18 RX ORDER — SULFAMETHOXAZOLE AND TRIMETHOPRIM 800; 160 MG/1; MG/1
1 TABLET ORAL 2 TIMES DAILY
Qty: 20 TABLET | Refills: 0 | Status: SHIPPED | OUTPATIENT
Start: 2024-09-18 | End: 2024-09-28

## 2024-09-18 NOTE — PROGRESS NOTES
"  Subjective   Patient ID: Sari Spaulding is a 54 y.o. female who presents for Sinus Problem (Dr Carvajal pt here today not feeling well yesterday; symptoms include yesterday sinus headache, left ear feels clogged-over weekend, drainage down throat and blowing nose since yesterday. Last week had a cold; drainage down throat, hacking cough-then all weekend fine.) and Infected Piercing (Got piercing in May on left ear for migraines; 2 weeks off/on has been inflamed, pus, sore. She isnt sure if a hair product built up in there or what. ).    HPI     Had left ear pierced in May   - 2.5 weeks ago slept funny on it and then cartilage got inflamed, red. Comign and going with this swelling/ pus. It's tender.     URI sxs:   - Sxs: had a cold 1.5 weeks ago and got over it and then yesterday having sinus headache and drainage and ear feels clogged. Doesn't feel sick otherwise   - Works at [employer] - exposed to sick contacts  - Took COVID test last week that was negative   - She does have allergies this time of year and usually takes Flonase and allegra or Claritin       Review of Systems   Skin:  Positive for rash.       Objective   /86   Pulse 86   Temp 36.2 °C (97.1 °F)   Resp 18   Ht 1.676 m (5' 6\")   Wt 80.3 kg (177 lb)   LMP 10/01/2020 (Approximate)   SpO2 99%   BMI 28.57 kg/m²     Physical Exam  HENT:      Head:        Comments: Small area of cellulitis, early abscess formation surrounding piercing        Assessment/Plan     Problem List Items Addressed This Visit    None  Visit Diagnoses       Cellulitis of antihelix of left ear    -  Primary    Relevant Medications    sulfamethoxazole-trimethoprim (Bactrim DS) 800-160 mg tablet            Will rx abx but may need to remove piercing - she doesn't want to as it is helping with her migraines so will try without       "

## 2024-09-24 ENCOUNTER — TREATMENT (OUTPATIENT)
Dept: PHYSICAL THERAPY | Facility: HOSPITAL | Age: 54
End: 2024-09-24
Payer: COMMERCIAL

## 2024-09-24 ENCOUNTER — APPOINTMENT (OUTPATIENT)
Dept: ORTHOPEDIC SURGERY | Facility: CLINIC | Age: 54
End: 2024-09-24
Payer: COMMERCIAL

## 2024-09-24 VITALS — HEIGHT: 66 IN | WEIGHT: 177 LBS | BODY MASS INDEX: 28.45 KG/M2

## 2024-09-24 DIAGNOSIS — M75.21 BICEPS TENDONITIS, RIGHT: ICD-10-CM

## 2024-09-24 DIAGNOSIS — M25.521 RIGHT ELBOW PAIN: Primary | ICD-10-CM

## 2024-09-24 DIAGNOSIS — M77.8 TRICEPS TENDONITIS: ICD-10-CM

## 2024-09-24 DIAGNOSIS — M25.511 ACUTE PAIN OF RIGHT SHOULDER: ICD-10-CM

## 2024-09-24 DIAGNOSIS — M77.11 LATERAL EPICONDYLITIS OF RIGHT ELBOW: ICD-10-CM

## 2024-09-24 DIAGNOSIS — M75.21 BICEPS TENDONITIS, RIGHT: Primary | ICD-10-CM

## 2024-09-24 DIAGNOSIS — M25.511 RIGHT SHOULDER PAIN, UNSPECIFIED CHRONICITY: ICD-10-CM

## 2024-09-24 PROCEDURE — 99213 OFFICE O/P EST LOW 20 MIN: CPT | Performed by: STUDENT IN AN ORGANIZED HEALTH CARE EDUCATION/TRAINING PROGRAM

## 2024-09-24 PROCEDURE — 3008F BODY MASS INDEX DOCD: CPT | Performed by: STUDENT IN AN ORGANIZED HEALTH CARE EDUCATION/TRAINING PROGRAM

## 2024-09-24 PROCEDURE — 97110 THERAPEUTIC EXERCISES: CPT | Mod: GP,CQ

## 2024-09-24 PROCEDURE — 1036F TOBACCO NON-USER: CPT | Performed by: STUDENT IN AN ORGANIZED HEALTH CARE EDUCATION/TRAINING PROGRAM

## 2024-09-24 ASSESSMENT — PAIN - FUNCTIONAL ASSESSMENT: PAIN_FUNCTIONAL_ASSESSMENT: 0-10

## 2024-09-24 ASSESSMENT — PAIN SCALES - GENERAL: PAINLEVEL_OUTOF10: 2

## 2024-09-24 NOTE — PROGRESS NOTES
Established follow-up patient Visit    HPI: Sari is a 54 y.o.female who presents today for follow-up of her right lateral epicondylitis, right triceps tendinitis, and right biceps tendinitis.  States that she is feeling about 75% better.  She still has some remaining pain at the lateral elbow, but the biceps and triceps are feeling better overall.  She states that the brace, NSAIDs, ice, and rest have helped.  She had her consultation with physical therapy, but has her first official session today.  She denies any interval falls or injuries.  She denies any swelling or bruising.  She denies any numbness and tingling.    On 9/3/2024, she presented with new complaints of right elbow pain.  She is right-hand dominant.  She states that in June she started going to the gym and began noticing some lateral elbow pain.  She used a forearm strap, which helped improve this pain, but over the past 2 to 3 weeks she has been doing a lot of home projects including tearing up some floors with a crowbar, which led to worsening pain in the elbow, but yesterday she was scraping out some floor using a different motion and began having some anterior arm pain.  She denies feeling any pop.  She denies any swelling and bruising.  She denies any numbness and tingling.  She has been trying ice, forearm strap, and rest.  She denies any falls or injuries.    Plan: Today, 9/24/2024, we will have her continue with her occupational therapy, rest, ice, the forearm strap, NSAIDs as needed, and will follow-up in about 4 to 5 weeks see how she is doing.    On 9/3/2024, for this right lateral epicondylitis, right triceps tendinitis, and right biceps tendinitis, we will start her in physical therapy, return to the forearm strap, start her on naproxen, continue with compression, ice, rest, activity modification, follow-up in 3 to 4 weeks.      Assessment:   Problem List Items Addressed This Visit    None  Visit Diagnoses       Biceps tendonitis,  right    -  Primary    Triceps tendonitis        Lateral epicondylitis of right elbow                  Diagnostics: Reviewed all relevant imaging including x-ray, MRI, CT, and US.      Procedure:  Procedures    Physical Exam:  GENERAL:  No obvious acute distress.  NEURO:  Distally neurovascularly intact.  Sensation intact to light touch.  Extremity: Exam of the right elbow:  Skin is intact with no signs of infection;  No swelling or bruising;  No erythema or warmth;  No pain today over the distal triceps and the proximal biceps long head tendon;  Mildly TENDER overlying the lateral epicondyle;  No tenderness overlying the medial epicondyle;  No pain with resisted extension at the wrist;  No pain with supination;  No pain with flexion;  Negative hook test;    No orders of the defined types were placed in this encounter.     At the conclusion of the visit there were no further questions by the patient/family regarding their plan of care.  Patient was instructed to call or return with any issues, questions, or concerns regarding their injury and/or treatment plan described above.     09/24/24 at 2:54 PM - Stef Higgins,     Office: [phone]    This note was prepared using voice recognition software.  The details of this note are correct and have been reviewed, and corrected to the best of my ability.  Some grammatical errors may persist related to the Dragon software.

## 2024-09-24 NOTE — PROGRESS NOTES
Physical Therapy Treatment    Patient Name: Sari Spaulding  MRN: [id number]  Today's Date: 9/24/2024     PT Therapeutic Procedures Time Entry  Therapeutic Exercise Time Entry: 41                   Current Problem  1. Right elbow pain  Follow Up In Physical Therapy      2. Right shoulder pain, unspecified chronicity        3. Lateral epicondylitis of right elbow        4. Triceps tendonitis        5. Biceps tendonitis, right        6. Acute pain of right shoulder  Follow Up In Physical Therapy          Insurance   MMO SU[employer] 60V PT OT COPAY 0 DED 0 COVERAGE 100 OOP [phone] NO AUTH REQ REF  Visit #2/8        Subjective   Pt stating her elbow is feeling alittle better, recently took the brace off but was instructed by Dr. Higgins to cont to wear it.    Pain  Pain Assessment: 0-10  0-10 (Numeric) Pain Score: 2  Pain Type: Acute pain  Pain Location: Elbow      Objective     Treatments:  UBE F/R 3'/'3  Rows GTB 2x10  High rows GTB 2x10  Tricep pull downs GTB 2x10  Wrist flex/ext 2# 2x10  Eccentric pro/supination 2# 2x10  Therabar all 3 ways yellow 2x10  3 way bicep curls 4# 2x10ea  Medball series 2.2# x15  Prone Y and T x15  Assessment:   Added multiple new exercises with good tolerance just reporting some discomfort with high rows and pronated bicep curls. Pt also reporting some tingling with prone Y and T but stating she has a bad neck. Educated on loaded cervical retractions and trying prone exercises bilaterally. Updated HEP and reviewed.     Plan:  Cont to assess symptoms and pain    HEP:  Access Code: [email or web address]  URL: [email or web address]  Date: 09/24/2024  Prepared by: Charito Tineo    Exercises  - Standing Lat Pull Down with Resistance - Elbows Bent  - 1 x daily - 7 x weekly - 2 sets - 10 reps  - Standing Row with Anchored Resistance  - 1 x daily - 7 x weekly - 2 sets - 10 reps  - Standing Tricep Extensions with Resistance  - 1 x daily - 7 x weekly - 2 sets - 10 reps  - Standing  Bicep Curls Supinated with Dumbbells  - 1 x daily - 7 x weekly - 2 sets - 10 reps  - Standing Bicep Curls Neutral with Dumbbells  - 1 x daily - 7 x weekly - 2 sets - 10 reps  - Standing Pronated Elbow Flexion with Dumbbell  - 1 x daily - 7 x weekly - 2 sets - 10 reps  - Prone Single Arm Shoulder Y  - 1 x daily - 7 x weekly - 15 reps  - Prone Shoulder Horizontal Abduction  - 1 x daily - 7 x weekly - 15 reps

## 2024-10-01 ENCOUNTER — TREATMENT (OUTPATIENT)
Dept: PHYSICAL THERAPY | Facility: HOSPITAL | Age: 54
End: 2024-10-01
Payer: COMMERCIAL

## 2024-10-01 DIAGNOSIS — M25.521 RIGHT ELBOW PAIN: Primary | ICD-10-CM

## 2024-10-01 DIAGNOSIS — M77.8 TRICEPS TENDONITIS: ICD-10-CM

## 2024-10-01 DIAGNOSIS — M75.21 BICEPS TENDONITIS, RIGHT: ICD-10-CM

## 2024-10-01 DIAGNOSIS — M25.511 RIGHT SHOULDER PAIN, UNSPECIFIED CHRONICITY: ICD-10-CM

## 2024-10-01 DIAGNOSIS — M25.511 ACUTE PAIN OF RIGHT SHOULDER: ICD-10-CM

## 2024-10-01 DIAGNOSIS — M77.11 LATERAL EPICONDYLITIS OF RIGHT ELBOW: ICD-10-CM

## 2024-10-01 PROCEDURE — 97110 THERAPEUTIC EXERCISES: CPT | Mod: GP,CQ

## 2024-10-01 PROCEDURE — 97140 MANUAL THERAPY 1/> REGIONS: CPT | Mod: GP,CQ

## 2024-10-01 ASSESSMENT — PAIN SCALES - GENERAL: PAINLEVEL_OUTOF10: 3

## 2024-10-01 ASSESSMENT — PAIN - FUNCTIONAL ASSESSMENT: PAIN_FUNCTIONAL_ASSESSMENT: 0-10

## 2024-10-01 NOTE — PROGRESS NOTES
"Physical Therapy Treatment    Patient Name: Sari Spaulding  MRN: [id number]  Today's Date: 10/1/2024     PT Therapeutic Procedures Time Entry  Manual Therapy Time Entry: 8  Therapeutic Exercise Time Entry: 34                   Current Problem  1. Right elbow pain  Follow Up In Physical Therapy      2. Right shoulder pain, unspecified chronicity        3. Lateral epicondylitis of right elbow        4. Triceps tendonitis        5. Biceps tendonitis, right        6. Acute pain of right shoulder  Follow Up In Physical Therapy          Insurance   MMO SUPER MED 60V PT OT COPAY 0 DED 0 COVERAGE 100 OOP [phone] NO AUTH REQ REF    Visit #3/8      Subjective   Pt stating she has been helping her  a little with house projects and noticed some discomfort the more she uses it.     Pain  Pain Assessment: 0-10  0-10 (Numeric) Pain Score: 3  Pain Type: Acute pain  Pain Location: Elbow      Objective     Treatments:  UBE F/R 3'/'3  Rows GTB 2x10  High rows GTB 2x10  Tricep pull downs GTB 2x10  Wrist flex/ext 2# 2x10-NT  Pronator 1# x10  Therabar all 3 ways red 2x10  Hammer curls & bicep curls 4# 2x10ea  Medball series 2.2# x15  Prone Y and T x15-NT  PB up the wall 5\" x10    Manual:  STM to forearm extensors  Assessment:  STM performed to forearm extensors to decrease tightness and reduce pain with relief noted. Added pronator and PB up the wall with good tolerance. Held on reverse bicep curls secondary to discomfort in her elbow. Pt stating she felt better after treatment.     Plan:  Cont to progress strength      "

## 2024-10-08 ENCOUNTER — APPOINTMENT (OUTPATIENT)
Dept: PHYSICAL THERAPY | Facility: HOSPITAL | Age: 54
End: 2024-10-08
Payer: COMMERCIAL

## 2024-10-16 ENCOUNTER — APPOINTMENT (OUTPATIENT)
Dept: PHYSICAL THERAPY | Facility: HOSPITAL | Age: 54
End: 2024-10-16
Payer: COMMERCIAL

## 2024-10-17 ENCOUNTER — TREATMENT (OUTPATIENT)
Dept: PHYSICAL THERAPY | Facility: HOSPITAL | Age: 54
End: 2024-10-17
Payer: COMMERCIAL

## 2024-10-17 DIAGNOSIS — M77.11 LATERAL EPICONDYLITIS OF RIGHT ELBOW: ICD-10-CM

## 2024-10-17 DIAGNOSIS — M25.511 ACUTE PAIN OF RIGHT SHOULDER: ICD-10-CM

## 2024-10-17 DIAGNOSIS — M25.521 RIGHT ELBOW PAIN: Primary | ICD-10-CM

## 2024-10-17 DIAGNOSIS — M75.21 BICEPS TENDONITIS, RIGHT: ICD-10-CM

## 2024-10-17 DIAGNOSIS — M25.511 RIGHT SHOULDER PAIN, UNSPECIFIED CHRONICITY: ICD-10-CM

## 2024-10-17 DIAGNOSIS — M77.8 TRICEPS TENDONITIS: ICD-10-CM

## 2024-10-17 PROCEDURE — 97110 THERAPEUTIC EXERCISES: CPT | Mod: GP,CQ

## 2024-10-17 PROCEDURE — 97140 MANUAL THERAPY 1/> REGIONS: CPT | Mod: GP,CQ

## 2024-10-17 ASSESSMENT — PAIN SCALES - GENERAL: PAINLEVEL_OUTOF10: 4

## 2024-10-17 ASSESSMENT — PAIN - FUNCTIONAL ASSESSMENT: PAIN_FUNCTIONAL_ASSESSMENT: 0-10

## 2024-10-17 NOTE — PROGRESS NOTES
"Physical Therapy Treatment    Patient Name: Sari Spaulding  MRN: [id number]  Today's Date: 10/17/2024     PT Therapeutic Procedures Time Entry  Manual Therapy Time Entry: 10  Therapeutic Exercise Time Entry: 35                   Current Problem  1. Right elbow pain  Follow Up In Physical Therapy      2. Right shoulder pain, unspecified chronicity        3. Lateral epicondylitis of right elbow        4. Triceps tendonitis        5. Biceps tendonitis, right        6. Acute pain of right shoulder  Follow Up In Physical Therapy          Insurance   MMO SUPER MED 60V PT OT COPAY 0 DED 0 COVERAGE 100 OOP [phone] NO AUTH REQ REF     Visit #4/8    Subjective   Pt stating the more she is using her arm the more she feels it. Follows up with Dr. Higgins on Monday.     Pain  Pain Assessment: 0-10  0-10 (Numeric) Pain Score: 4  Pain Type: Acute pain  Pain Location: Elbow      Objective     Treatments:  UBE F/R 3'/'3  Rows GTB 2x15  High rows GTB 2x15  Tricep pull downs GTB 2x15  Wrist flex/ext 2# 2x10-NT  Pronator 1# x10  Therabar all 3 ways red 2x10  Hammer curls & bicep curls 5# 2x10ea  Medball series 2.2# 2x10  Prone Y and T x15-NT  PB up the wall 5\" x10-NT    Manual:  STM to forearm extensors   Rocktape to forearm extensors with star  Assessment:  Increased reps with theraband strengthening. Also increased wt with bicep curls with muscle fatigue reported. STM and taping performed to decrease pain with relief noted. Educated pt to remove tape if she starts to have any irration to her skin    Plan:  Cont to assess symptoms and pain  "

## 2024-10-21 ENCOUNTER — APPOINTMENT (OUTPATIENT)
Dept: ORTHOPEDIC SURGERY | Facility: CLINIC | Age: 54
End: 2024-10-21
Payer: COMMERCIAL

## 2024-10-21 DIAGNOSIS — M77.11 LATERAL EPICONDYLITIS OF RIGHT ELBOW: ICD-10-CM

## 2024-10-21 PROCEDURE — 99213 OFFICE O/P EST LOW 20 MIN: CPT | Performed by: STUDENT IN AN ORGANIZED HEALTH CARE EDUCATION/TRAINING PROGRAM

## 2024-10-21 NOTE — PROGRESS NOTES
Established follow-up patient Visit    HPI: Sari is a 54 y.o.female who presents today for follow-up of her right elbow lateral epicondylitis.  She states that as she has returned to more of her normal activities, she has noticed a return of some of the pain.  She denies any interval falls or injuries.  She denies any swelling or bruising.  She does get some occasional numbness and tingling, but this is not persistent.  She has been doing her occupational therapy and home exercises.  She is no longer taking the naproxen consistently.    On 9/24/24, she presented for follow-up of her right lateral epicondylitis, right triceps tendinitis, and right biceps tendinitis.  States that she is feeling about 75% better.  She still has some remaining pain at the lateral elbow, but the biceps and triceps are feeling better overall.  She states that the brace, NSAIDs, ice, and rest have helped.  She had her consultation with physical therapy, but has her first official session today.  She denies any interval falls or injuries.  She denies any swelling or bruising.  She denies any numbness and tingling.    On 9/3/2024, she presented with new complaints of right elbow pain.  She is right-hand dominant.  She states that in June she started going to the gym and began noticing some lateral elbow pain.  She used a forearm strap, which helped improve this pain, but over the past 2 to 3 weeks she has been doing a lot of home projects including tearing up some floors with a crowbar, which led to worsening pain in the elbow, but yesterday she was scraping out some floor using a different motion and began having some anterior arm pain.  She denies feeling any pop.  She denies any swelling and bruising.  She denies any numbness and tingling.  She has been trying ice, forearm strap, and rest.  She denies any falls or injuries.    Plan: Today, on 10/21/2024, we will have her continue with her occupational therapy, home exercises, or  forearm strap, we will add a wrist brace to settle down her extensor movements, restart her naproxen for about a week, and will ice/heat.  Hopefully this conservative treatment route leads to more improvement, but if not, we will consider corticosteroid injection at the next appointment.  Follow-up in 3 to 4 weeks.    9/24/2024, we will have her continue with her occupational therapy, rest, ice, the forearm strap, NSAIDs as needed, and will follow-up in about 4 to 5 weeks see how she is doing.    On 9/3/2024, for this right lateral epicondylitis, right triceps tendinitis, and right biceps tendinitis, we will start her in physical therapy, return to the forearm strap, start her on naproxen, continue with compression, ice, rest, activity modification, follow-up in 3 to 4 weeks.      Assessment:   Problem List Items Addressed This Visit    None  Visit Diagnoses       Lateral epicondylitis of right elbow        Relevant Orders    Wrist brace                Diagnostics: Reviewed all relevant imaging including x-ray, MRI, CT, and US.      Procedure:  Procedures    Physical Exam:  GENERAL:  No obvious acute distress.  NEURO:  Distally neurovascularly intact.  Sensation intact to light touch.  Extremity: Exam of the right elbow:  Skin is intact with no signs of infection;  No swelling or bruising;  No erythema or warmth;  No pain today over the distal triceps and the proximal biceps long head tendon;  Mildly TENDER overlying the lateral epicondyle;  No tenderness overlying the medial epicondyle;  No pain with resisted extension at the wrist;  Mildly painful with resisted supination;  No pain with flexion;  Negative hook test;    Orders Placed This Encounter    Wrist brace      At the conclusion of the visit there were no further questions by the patient/family regarding their plan of care.  Patient was instructed to call or return with any issues, questions, or concerns regarding their injury and/or treatment plan described  above.     10/21/24 at 3:48 PM - Stef Higgins, DO    Office: [phone]    This note was prepared using voice recognition software.  The details of this note are correct and have been reviewed, and corrected to the best of my ability.  Some grammatical errors may persist related to the Dragon software.

## 2024-10-23 ENCOUNTER — TREATMENT (OUTPATIENT)
Dept: PHYSICAL THERAPY | Facility: HOSPITAL | Age: 54
End: 2024-10-23
Payer: COMMERCIAL

## 2024-10-23 DIAGNOSIS — M25.511 ACUTE PAIN OF RIGHT SHOULDER: ICD-10-CM

## 2024-10-23 DIAGNOSIS — M25.521 RIGHT ELBOW PAIN: Primary | ICD-10-CM

## 2024-10-23 PROCEDURE — 97110 THERAPEUTIC EXERCISES: CPT | Mod: GP

## 2024-10-23 PROCEDURE — 97035 APP MDLTY 1+ULTRASOUND EA 15: CPT | Mod: GP

## 2024-10-23 ASSESSMENT — PAIN SCALES - GENERAL: PAINLEVEL_OUTOF10: 2

## 2024-10-23 ASSESSMENT — PAIN - FUNCTIONAL ASSESSMENT: PAIN_FUNCTIONAL_ASSESSMENT: 0-10

## 2024-10-23 NOTE — PROGRESS NOTES
Physical Therapy Treatment    Patient Name: Sari Spaulding  MRN: [id number]  Today's Date: 10/23/2024     PT Therapeutic Procedures Time Entry  Manual Therapy Time Entry: 5  Therapeutic Exercise Time Entry: 27  PT Modalities Time Entry  Ultrasound Time Entry: 7                Current Problem  1. Right elbow pain  Follow Up In Physical Therapy      2. Acute pain of right shoulder  Follow Up In Physical Therapy          Insurance   [employer] 60V PT OT COPAY 0 DED 0 COVERAGE 100 OOP [phone] NO AUTH REQ REF     Visit #4/8    Subjective   Continues to have soreness with physical activity. Currently 2/10 in lateral elbow. Reports KT tape helped. Using brace now and isn't getting as much tingling. When she does get tingling it is from the lateral elbow and down to hand. Upper arm/shoulder has been pretty good.     Asked if we could try US or DN today.     Pain  Pain Assessment: 0-10  0-10 (Numeric) Pain Score: 2  Pain Type: Acute pain  Pain Location: Elbow      Objective     Treatments:  TherEx: 27 min   UBE F/R 3'/'3   Wrist flex/ext 2# 2x10 each on R   Pronation/Supination with 2# DB 2x10 R   Bicep curls with 5#DB with eccentric lowering 2x10 B   Shoulder ER/IR with elbow tucked into side using 5# DB 2x10 R   Standing arm press forward with RTB 3x10 R  Therabar all 3 ways red 2x10 (smiley, frowny, twist)  4 way wrist extensor stretch x30sec each on R     Manual: 5 min   Rocktape to forearm extensors with star  Palpation and gentle STM to R forearm extensors     Ultrasound: 7 min   Pt is agreeable to use of therapeutic US for tissue healing and pain reduction through deep tissue heating. Pt denies current fracture, infection, malignancy within last 5 years, open sores in treatment area    SUPERFICIAL TISSUE:   Set up of therapeutic US, draping and set up of equipment. Therapeutic US to R lateral epicondyle and wrist extensors to improve blood flow and tendon healing. Performed in seated   US 3 MHz   1.0  W/cm2  100% duty cycle  x7 min -- plus set up      Assessment:  Continued with current strengthening program due to continued challenge with exercises- pt tolerated well. Decided to perform US today to promote improved blood flow with + response noted of reduced pain. Discussed possible DN at next session.     Plan:  Cont to assess symptoms and pain    Possible DN  Response to US

## 2024-10-30 ENCOUNTER — TREATMENT (OUTPATIENT)
Dept: PHYSICAL THERAPY | Facility: HOSPITAL | Age: 54
End: 2024-10-30
Payer: COMMERCIAL

## 2024-10-30 DIAGNOSIS — M25.511 ACUTE PAIN OF RIGHT SHOULDER: ICD-10-CM

## 2024-10-30 DIAGNOSIS — M25.521 RIGHT ELBOW PAIN: ICD-10-CM

## 2024-10-30 PROCEDURE — 97140 MANUAL THERAPY 1/> REGIONS: CPT | Mod: GP

## 2024-10-30 PROCEDURE — 97035 APP MDLTY 1+ULTRASOUND EA 15: CPT | Mod: GP

## 2024-10-30 PROCEDURE — 97110 THERAPEUTIC EXERCISES: CPT | Mod: GP

## 2024-10-30 ASSESSMENT — PAIN - FUNCTIONAL ASSESSMENT: PAIN_FUNCTIONAL_ASSESSMENT: 0-10

## 2024-10-30 ASSESSMENT — PAIN SCALES - GENERAL: PAINLEVEL_OUTOF10: 1

## 2024-10-30 ASSESSMENT — PAIN DESCRIPTION - DESCRIPTORS: DESCRIPTORS: SORE

## 2024-11-06 ENCOUNTER — TREATMENT (OUTPATIENT)
Dept: PHYSICAL THERAPY | Facility: HOSPITAL | Age: 54
End: 2024-11-06
Payer: COMMERCIAL

## 2024-11-06 DIAGNOSIS — M25.511 ACUTE PAIN OF RIGHT SHOULDER: ICD-10-CM

## 2024-11-06 DIAGNOSIS — M25.521 RIGHT ELBOW PAIN: ICD-10-CM

## 2024-11-06 PROCEDURE — 97110 THERAPEUTIC EXERCISES: CPT | Mod: GP

## 2024-11-06 PROCEDURE — 97140 MANUAL THERAPY 1/> REGIONS: CPT | Mod: GP

## 2024-11-06 PROCEDURE — 97012 MECHANICAL TRACTION THERAPY: CPT | Mod: GP

## 2024-11-06 ASSESSMENT — PAIN SCALES - GENERAL: PAINLEVEL_OUTOF10: 4

## 2024-11-06 ASSESSMENT — PAIN - FUNCTIONAL ASSESSMENT: PAIN_FUNCTIONAL_ASSESSMENT: 0-10

## 2024-11-06 ASSESSMENT — PAIN DESCRIPTION - DESCRIPTORS: DESCRIPTORS: TINGLING

## 2024-11-06 NOTE — PROGRESS NOTES
Physical Therapy Treatment    Patient Name: Sari Spaulding  MRN: [id number]  Today's Date: 11/6/2024     PT Therapeutic Procedures Time Entry  Manual Therapy Time Entry: 15  Therapeutic Exercise Time Entry: 15  PT Modalities Time Entry  Mechanical Traction Time Entry: 8                Current Problem  1. Right elbow pain  Follow Up In Physical Therapy      2. Acute pain of right shoulder  Follow Up In Physical Therapy              Insurance   MMO [employer] 60V PT OT COPAY 0 DED 0 COVERAGE 100 OOP [phone] NO AUTH REQ REF     Visit #7/8    Subjective     Got more tingling in arm after lifting dog on halloween and got more tingling yesterday.   Gets L sided neck pain. Denies pain currently and pain wise has been better. At worst has gotten to 4/10 with lifting.     Current tingling sensation: 4/10 in lateral 2 fingers and lateral forearm     Pain  Pain Assessment: 0-10  0-10 (Numeric) Pain Score: 4  Pain Location: Arm (Forearm (medial forearm and into medial 2 fingers - pinky))  Pain Descriptors: Tingling      Objective   Cervical spine tests:   + spurling on R / (-) on L   + distraction with decreased tingling in arm       Treatments:  TherEx: 15 min   Unloaded supine chin tucks 2x10 (reports of referral down arm at end of 2nd set- DC)   Cervical extension snags x15   Cervical rotation snags x10 B    Standing OA nods x5 (DC 2/2 increased referral down arm)     Manual: 15 min   Suboccipital release and manual cervical traction to reduce radicular symptoms     Assessment of neck- see objective.     Mechanical Traction: 8 min   Cervical Traction 15# pressure x8 min     Assessment:  Assessment of cspine performed today 2/2 reports of specific referral pattern into hand with N/T. + radicular ST noted for cspine referral and abolishment of symptoms with cervical distraction. HEP adjusted to promote cervical spine mobility and centralization of symptoms. 0/10 tingling and pain at end of session.      Plan:  REASSESS  Response to traction/ extension based exercises for neck?   Possible DN to homeostatic point - 2 finger widths from lateral epicondyle

## 2024-11-11 ENCOUNTER — HOSPITAL ENCOUNTER (OUTPATIENT)
Dept: RADIOLOGY | Facility: HOSPITAL | Age: 54
Discharge: HOME | End: 2024-11-11
Payer: COMMERCIAL

## 2024-11-11 ENCOUNTER — APPOINTMENT (OUTPATIENT)
Dept: ORTHOPEDIC SURGERY | Facility: CLINIC | Age: 54
End: 2024-11-11
Payer: COMMERCIAL

## 2024-11-11 DIAGNOSIS — M54.2 NECK PAIN: ICD-10-CM

## 2024-11-11 DIAGNOSIS — M54.2 NECK PAIN: Primary | ICD-10-CM

## 2024-11-11 DIAGNOSIS — M54.12 CERVICAL RADICULOPATHY: ICD-10-CM

## 2024-11-11 PROCEDURE — 72040 X-RAY EXAM NECK SPINE 2-3 VW: CPT

## 2024-11-11 PROCEDURE — 99214 OFFICE O/P EST MOD 30 MIN: CPT | Performed by: STUDENT IN AN ORGANIZED HEALTH CARE EDUCATION/TRAINING PROGRAM

## 2024-11-11 PROCEDURE — 72040 X-RAY EXAM NECK SPINE 2-3 VW: CPT | Performed by: RADIOLOGY

## 2024-11-11 RX ORDER — METHYLPREDNISOLONE 4 MG/1
TABLET ORAL
Qty: 1 EACH | Refills: 0 | Status: SHIPPED | OUTPATIENT
Start: 2024-11-11

## 2024-11-11 NOTE — PROGRESS NOTES
Established follow-up patient Visit    HPI: Sari is a 54 y.o.female who presents today for follow-up of her right lateral epicondylitis.  She states that she is feeling better regarding her right elbow.  States the pain is almost resolved.  She has been doing well with occupational therapy and home exercises.  She was wearing her wrist brace and forearm strap.  She denies any interval falls or injuries.  However, unfortunately, she has developed a numbness and tingling into the pinky and ring finger of the right hand as well.  She also has some of the paresthesias into the dorsum of the hand.  She denies any significant neck pain.  He denies any issues with the neck in the past and denies any surgeries.    On 10/21/2024, she presented for follow-up of her right elbow lateral epicondylitis.  She states that as she has returned to more of her normal activities, she has noticed a return of some of the pain.  She denies any interval falls or injuries.  She denies any swelling or bruising.  She does get some occasional numbness and tingling, but this is not persistent.  She has been doing her occupational therapy and home exercises.  She is no longer taking the naproxen consistently.    On 9/24/24, she presented for follow-up of her right lateral epicondylitis, right triceps tendinitis, and right biceps tendinitis.  States that she is feeling about 75% better.  She still has some remaining pain at the lateral elbow, but the biceps and triceps are feeling better overall.  She states that the brace, NSAIDs, ice, and rest have helped.  She had her consultation with physical therapy, but has her first official session today.  She denies any interval falls or injuries.  She denies any swelling or bruising.  She denies any numbness and tingling.    On 9/3/2024, she presented with new complaints of right elbow pain.  She is right-hand dominant.  She states that in June she started going to the gym and began noticing some  lateral elbow pain.  She used a forearm strap, which helped improve this pain, but over the past 2 to 3 weeks she has been doing a lot of home projects including tearing up some floors with a crowbar, which led to worsening pain in the elbow, but yesterday she was scraping out some floor using a different motion and began having some anterior arm pain.  She denies feeling any pop.  She denies any swelling and bruising.  She denies any numbness and tingling.  She has been trying ice, forearm strap, and rest.  She denies any falls or injuries.    Plan: Today, on 11/11/2024, for this right cervical radiculopathy, we will have her start physical therapy, start a Medrol Dosepak, and follow-up in 4 to 5 weeks.  Consider an MRI if she is not better.  Continue conservative treatment measures for her right lateral epicondylitis that is improving, and she can return to activities as tolerated.    On 10/21/2024, we will have her continue with her occupational therapy, home exercises, or forearm strap, we will add a wrist brace to settle down her extensor movements, restart her naproxen for about a week, and will ice/heat.  Hopefully this conservative treatment route leads to more improvement, but if not, we will consider corticosteroid injection at the next appointment.  Follow-up in 3 to 4 weeks.    9/24/2024, we will have her continue with her occupational therapy, rest, ice, the forearm strap, NSAIDs as needed, and will follow-up in about 4 to 5 weeks see how she is doing.    On 9/3/2024, for this right lateral epicondylitis, right triceps tendinitis, and right biceps tendinitis, we will start her in physical therapy, return to the forearm strap, start her on naproxen, continue with compression, ice, rest, activity modification, follow-up in 3 to 4 weeks.      Assessment:   Problem List Items Addressed This Visit    None  Visit Diagnoses       Neck pain    -  Primary    Relevant Medications    methylPREDNISolone (Medrol  Dospak) 4 mg tablets    Other Relevant Orders    XR cervical spine 2-3 views    Referral to Physical Therapy    Cervical radiculopathy        Relevant Medications    methylPREDNISolone (Medrol Dospak) 4 mg tablets    Other Relevant Orders    Referral to Physical Therapy                  Diagnostics: Reviewed all relevant imaging including x-ray, MRI, CT, and US.      Procedure:  Procedures    Physical Exam:  GENERAL:  No obvious acute distress.  NEURO:  Distally neurovascularly intact.  Sensation intact to light touch.  Extremity: Exam of the right elbow:  Skin is intact with no signs of infection;  No swelling or bruising;  No erythema or warmth;  No pain today over the distal triceps and the proximal biceps long head tendon;  No pain overlying the lateral epicondyle;  No tenderness overlying the medial epicondyle;  No pain with resisted extension at the wrist;  No pain with resisted supination;  No pain with flexion;  Negative hook test;    Exam of the cervical spine:  No tenderness along the midline of the cervical spine;  No step-offs along the midline of the cervical spine;  TENDER into the right trapezius muscle;  Flexion is full with no pain;  Extension is full with no pain;  Sidebending is full and symmetric with no pain;  Rotation is full and symmetric with no pain;  Negative Spurling's on the right;  Negative Spurling's on the left;  Full strength and range of motion throughout the bilateral upper extremities; and  Nonantalgic gait.    Orders Placed This Encounter    XR cervical spine 2-3 views    Referral to Physical Therapy    methylPREDNISolone (Medrol Dospak) 4 mg tablets      At the conclusion of the visit there were no further questions by the patient/family regarding their plan of care.  Patient was instructed to call or return with any issues, questions, or concerns regarding their injury and/or treatment plan described above.     11/11/24 at 11:32 AM - Stef Higgins DO    Office: [phone]    This note was prepared using voice recognition software.  The details of this note are correct and have been reviewed, and corrected to the best of my ability.  Some grammatical errors may persist related to the Dragon software.

## 2024-11-13 ENCOUNTER — TREATMENT (OUTPATIENT)
Dept: PHYSICAL THERAPY | Facility: HOSPITAL | Age: 54
End: 2024-11-13
Payer: COMMERCIAL

## 2024-11-13 DIAGNOSIS — M25.511 ACUTE PAIN OF RIGHT SHOULDER: ICD-10-CM

## 2024-11-13 DIAGNOSIS — M54.2 NECK PAIN: Primary | ICD-10-CM

## 2024-11-13 DIAGNOSIS — M25.521 RIGHT ELBOW PAIN: ICD-10-CM

## 2024-11-13 PROCEDURE — 97140 MANUAL THERAPY 1/> REGIONS: CPT | Mod: GP

## 2024-11-13 PROCEDURE — 97164 PT RE-EVAL EST PLAN CARE: CPT | Mod: GP

## 2024-11-13 PROCEDURE — 97110 THERAPEUTIC EXERCISES: CPT | Mod: GP

## 2024-11-13 ASSESSMENT — PAIN - FUNCTIONAL ASSESSMENT: PAIN_FUNCTIONAL_ASSESSMENT: 0-10

## 2024-11-13 ASSESSMENT — PAIN SCALES - GENERAL: PAINLEVEL_OUTOF10: 0 - NO PAIN

## 2024-11-13 NOTE — PROGRESS NOTES
Physical Therapy Treatment    Patient Name: Sari Spaulding  MRN: [id number]  Today's Date: 11/13/2024  PT Evaluation Time Entry  PT Re-Evaluation Time Entry: 15  PT Therapeutic Procedures Time Entry  Manual Therapy Time Entry: 15  Therapeutic Exercise Time Entry: 10                   Current Problem  1. Neck pain        2. Right elbow pain  Follow Up In Physical Therapy      3. Acute pain of right shoulder  Follow Up In Physical Therapy          Insurance   MMO [employer] 60V PT OT COPAY 0 DED 0 COVERAGE 100 OOP [phone] NO AUTH REQ REF     Visit #8    Subjective   Not getting tingling as much without the strap and wrist brace. Denies any NT today and forgot to put strap on today.   Reports mild stiffness today in neck. Denies pain in forearm currently.   Currently on steroids to address inflammation.     + Hx of migraines/HA but do not correspond to neck pain.   Denies dizziness, nausea, changes in vision   Denies any shoulder pain     Pain  Pain Assessment: 0-10  0-10 (Numeric) Pain Score: 0 - No pain      Objective   Posture: B rounded shoulders    Cervical spine clearance and ST:   - Alar ligament (supine, palpate C2 SP, compress top of head + small sidebend of head, then rotate head) (-)   - Sharp Tamara (Transverse ligament) (-)   - VBI - (-)   - Spurling + on R / - on L   - cervical distraction: + with reduction in NT   - ULTT: + for median and radial nerve entrapment on the R   - Flexion rotation test (Cervicogenic HA assessment) (-) B    AROM:  - Cervical Spine Flexion           WNL   - Cervical Spine Extension       WNL   - Cervical Spine Rotation          WNL B   - Cervical Spine Sidebending   L     30 with L sided pulling sensation      R 35  - Shoulder Flexion                    L     WNL   R WNL  - Shoulder Extension               L     wnl      R WNL  - Shoulder ABD                       L    wnl       R WNL   - Shoulder ER                         WNL B   - Shoulder IR                            L   t11       R t11  - Elbow flexion                        L    wnl      R wnl   - Elbow extension                   L     0     R lacking 3 with AROM / able to achieve 0 with PROM   Wrist ROM WNL with tightness during wrist flexion      Strength   ROM:  Cervical MMT WNL   - Shoulder Flexion                    L   5       R5  - Shoulder Extension               L     5     R5  - Shoulder ABD                       L     5     R5 with mild pain   - Shoulder ER                          L      5    R5  - Shoulder IR                           L      5    R5  - Elbow flexion                        L     5    R5  - Elbow extension                   L     5     R 5  - Forearm supination               L    5      R5  - forearm pronation                 L   5       R5  - wrist flexion                         L    5      R5  - wrist extension                    L     5     R5  - wrist radial deviation           L     5     R5  - wrist ulnar deviation            L     5     R5        Postural muscle strength: (PER CLINICIAN ERROR- NOT ASSESSED ON 11/13)   - LT                                     L   4-       R 3+with discomfort   - MT                                    L   4+       R 4 with discomfort   - Rhomboids                       L    4+      R4  - Latissimus Dorsi               L    4+      R 4+ with discomfort      Special Tests:  Elbow     mills test (lateral epicondylitis)-  + R   / (-) L      medial epicondylitis test -  (-) R  / (-) L      Palpation: TTP and mm spasm in B LS, UT, MT, LT, rhomboids, SCM, scalenes, and wrist extensors   Hypomobile Tspine       Treatments:    TherAct: 15 min   Reassessment performed today to assess progress towards goals. Pt educated on findings of reassessment, plan of care, HEP and goals to promote pt progress for reduced s/s. See ortho section for updated objective measures.    TherEx: 10 min   SL open books x10 B   Foam rolling thoracic spine with rolling and extension      Manual: 15 min   Cupping and DTM to B LS, UT, MT, rhomboids, LT, posterior RTC to reduce mm spasm and neck/shoulder pain.   PROM of neck   Manual LS stretch     Assessment:  Pt has attended 8 consecutive physical therapy sessions 2/2 R elbow and shoulder pain consistent with lateral epicondylitis, triceps and biceps tendinitis and mm spasm. Reassessment performed today to add body part due to neck pain and possible referral from neck. This pt has shown improvement in pain intensity and frequency, reduction in NT, increased strength and shoulder and elbow ROM. This pt continues to show decreased posture, elbow AROM, decreased neck ROM, decreased strength and tenderness in postural musculature, posterior RTC and wrist extensors.   Functional limitations include:  pushing, pulling, lifting items, sleeping, working out and doing housework. This pt will benefit from continued skilled PT services 1x/week for 8 weeks to address the limitations above.      Plan:  Postural strengthening   Pec stretching   Nerve glides??       Goals:  STGs: 4 weeks      Pt will demonstrate symmetrical AROM WNL without pain for unlimited ability to perform home household/recreational/occupational tasks and to allow for correct mechanics with functional mobility. GOAL PROGRESSING      Pt will demonstrate good posture with <2 cues for correction during 45 minute treatment session in order to enhance body mechanics with ADLs, functional mobility, and recreational/occupational duties. NOT MET      Pt will demonstrate independence and report compliance with HEP to facilitate independent rehab program upon discharge. GOAL MET      LTGs: 8 weeks      Pt will demonstrate increased strength in deficient muscles by 1/2 MMT grade to assist with improved posture and reduced shoulder pain. GOAL MET      Pt will demonstrate (-) mill's test to demonstrate improved tissue mobility to allow for pulling items without elbow pain. GOAL NOT MET      Pt will  demonstrate ability to lift 10# item with proper form without shoulder pain to allow for return to ADLs. GOAL PROGRESSING- able to lift 5# without pain      Pt will report improved QuickDASH score by 8 points (MCID) to demonstrate improved functional mobility and improved QOL. Baseline: 24 GOAL PROGRESSING: score of 19     NEW GOALS  as of 11/13/24:     Pt will report 75% improvement in neck pain, numbness, tingling sensations for improved QOL.     Pt will demonstrate improved NDI scores by 7 points to improve QOL and ability to perform functional mobility. Baseline: 6 points or 12%

## 2024-11-14 DIAGNOSIS — E03.8 OTHER SPECIFIED HYPOTHYROIDISM: ICD-10-CM

## 2024-11-14 RX ORDER — LEVOTHYROXINE SODIUM 50 UG/1
50 TABLET ORAL DAILY
Qty: 90 TABLET | Refills: 3 | Status: SHIPPED | OUTPATIENT
Start: 2024-11-14

## 2024-11-25 ENCOUNTER — TELEPHONE (OUTPATIENT)
Dept: PHYSICAL THERAPY | Facility: HOSPITAL | Age: 54
End: 2024-11-25
Payer: COMMERCIAL

## 2024-11-25 DIAGNOSIS — M25.511 ACUTE PAIN OF RIGHT SHOULDER: ICD-10-CM

## 2024-11-25 DIAGNOSIS — M25.521 RIGHT ELBOW PAIN: ICD-10-CM

## 2024-11-25 DIAGNOSIS — M54.2 NECK PAIN: Primary | ICD-10-CM

## 2024-12-09 ENCOUNTER — APPOINTMENT (OUTPATIENT)
Dept: PHYSICAL THERAPY | Facility: HOSPITAL | Age: 54
End: 2024-12-09
Payer: COMMERCIAL

## 2024-12-09 DIAGNOSIS — M25.521 RIGHT ELBOW PAIN: ICD-10-CM

## 2024-12-09 DIAGNOSIS — M25.511 ACUTE PAIN OF RIGHT SHOULDER: ICD-10-CM

## 2024-12-16 ENCOUNTER — APPOINTMENT (OUTPATIENT)
Dept: ORTHOPEDIC SURGERY | Facility: CLINIC | Age: 54
End: 2024-12-16
Payer: COMMERCIAL

## 2024-12-16 ENCOUNTER — APPOINTMENT (OUTPATIENT)
Dept: PHYSICAL THERAPY | Facility: HOSPITAL | Age: 54
End: 2024-12-16
Payer: COMMERCIAL

## 2024-12-16 DIAGNOSIS — M25.521 RIGHT ELBOW PAIN: ICD-10-CM

## 2024-12-16 DIAGNOSIS — M25.511 ACUTE PAIN OF RIGHT SHOULDER: ICD-10-CM

## 2024-12-23 ENCOUNTER — APPOINTMENT (OUTPATIENT)
Dept: PHYSICAL THERAPY | Facility: HOSPITAL | Age: 54
End: 2024-12-23
Payer: COMMERCIAL

## 2024-12-23 DIAGNOSIS — M25.511 ACUTE PAIN OF RIGHT SHOULDER: ICD-10-CM

## 2024-12-23 DIAGNOSIS — M25.521 RIGHT ELBOW PAIN: ICD-10-CM

## 2024-12-27 ENCOUNTER — APPOINTMENT (OUTPATIENT)
Dept: ORTHOPEDIC SURGERY | Facility: CLINIC | Age: 54
End: 2024-12-27
Payer: COMMERCIAL

## 2024-12-30 ENCOUNTER — TREATMENT (OUTPATIENT)
Dept: PHYSICAL THERAPY | Facility: HOSPITAL | Age: 54
End: 2024-12-30
Payer: COMMERCIAL

## 2024-12-30 DIAGNOSIS — M25.521 RIGHT ELBOW PAIN: ICD-10-CM

## 2024-12-30 DIAGNOSIS — M54.2 NECK PAIN: Primary | ICD-10-CM

## 2024-12-30 DIAGNOSIS — M25.511 ACUTE PAIN OF RIGHT SHOULDER: ICD-10-CM

## 2024-12-30 PROCEDURE — 97140 MANUAL THERAPY 1/> REGIONS: CPT | Mod: GP

## 2024-12-30 PROCEDURE — 97110 THERAPEUTIC EXERCISES: CPT | Mod: GP

## 2024-12-30 NOTE — PROGRESS NOTES
Physical Therapy Treatment    Patient Name: Sari Spaulding  MRN: [id number]  Today's Date: 12/30/2024     PT Therapeutic Procedures Time Entry  Manual Therapy Time Entry: 19  Therapeutic Exercise Time Entry: 23                   Current Problem  1. Neck pain        2. Right elbow pain  Follow Up In Physical Therapy      3. Acute pain of right shoulder  Follow Up In Physical Therapy          Insurance   MMO [employer] 60V PT OT COPAY 0 DED 0 COVERAGE 100 OOP [phone] NO AUTH REQ REF     Visit #9    Subjective     Elbow was good then started doing a lot more and stopped doing her exercises and pain increased.   Has been foam rolling a lot and thoracic extension.   Just having some neck stiffness. Reports mild elbow pain currently. Some stiffness with L sided rotation of neck but not painful.   Denies any N/T recently.     Pain         Objective       Treatments:    TherEx: 23 min   UE ergometer 3F/3R level 2   OA nods at wall x10   Cervical ext snags x10   Cervical rotation snags x10 B   Prone on elbows cervical ext 2x10   Prone on elbows cervical rotation 2x10 B    Rows with GTB 2x10   Shoulder ext with GTB 2x10     Manual: 19 min   G3/4 C7 sideglides   G3/4 thoracic PA joint mobs   STM to B UT, LS, scalenes, suboccipitals to reduce mm spasm and pain.  PROM of neck     Assessment:  Continued with manual therapy to address cervical restrictions with visible improvement in ROM and subjective improvement in stiffness. HEP updated to incorporate more strengthening and cervical ROM.     Plan:  Postural strengthening   Pec stretching      HEP:   Access Code: [email or web address]  URL: [email or web address]  Date: 12/30/2024  Prepared by: Saima Zamarripa    Exercises  - Thoracic Extension Mobilization on Foam Roll  - 1 x daily - 7 x weekly - 2 sets - 15 reps  - Thoracic Mobilization on Foam Roll  - 1 x daily - 7 x weekly - 2 sets - 15 reps  - Sidelying Thoracic Rotation with Open Book  - 1 x daily - 7  x weekly - 2 sets - 15 reps  - Cervical Extension Prone on Elbows  - 1 x daily - 7 x weekly - 2 sets - 10 reps  - Cervical Rotation Prone on Elbows  - 1 x daily - 7 x weekly - 2 sets - 10 reps  - Standing Shoulder Row with Anchored Resistance  - 1 x daily - 7 x weekly - 3 sets - 10 reps  - Shoulder extension with resistance - Neutral  - 1 x daily - 7 x weekly - 3 sets - 10 reps

## 2025-01-03 ENCOUNTER — TELEPHONE (OUTPATIENT)
Dept: PHYSICAL THERAPY | Facility: HOSPITAL | Age: 55
End: 2025-01-03

## 2025-01-03 ENCOUNTER — APPOINTMENT (OUTPATIENT)
Dept: ORTHOPEDIC SURGERY | Facility: CLINIC | Age: 55
End: 2025-01-03
Payer: COMMERCIAL

## 2025-01-03 DIAGNOSIS — M25.511 ACUTE PAIN OF RIGHT SHOULDER: ICD-10-CM

## 2025-01-03 DIAGNOSIS — M54.12 CERVICAL RADICULOPATHY: ICD-10-CM

## 2025-01-03 DIAGNOSIS — M54.2 NECK PAIN: Primary | ICD-10-CM

## 2025-01-03 DIAGNOSIS — M25.521 RIGHT ELBOW PAIN: ICD-10-CM

## 2025-01-03 DIAGNOSIS — M77.11 LATERAL EPICONDYLITIS OF RIGHT ELBOW: Primary | ICD-10-CM

## 2025-01-03 NOTE — TELEPHONE ENCOUNTER
PC TO PT TO ADV APPT SCHED 01/20/25 MOVED TO NEW APPT TIME OF 10AM TO FIT ANOTHER PT ON SCHED; LMOM IN REFERENCE

## 2025-01-03 NOTE — PROGRESS NOTES
Established follow-up patient Visit    HPI: Sari is a 54 y.o.female who presents today for follow-up of her right lateral epicondylitis.  She is also following up for right cervical radiculopathy status post Medrol Dosepak and physical therapy.  She states that her elbow had been feeling at least 98% better, then she stopped doing exercises and began carrying heavier items like groceries, and began to have some pain again.  She states that she returned to her exercises and she had relief again.  She does not have any significant issues with the elbow at this time.  The tingling that she had been experiencing is no longer present and she has been doing exercises for her neck from physical therapy and her home exercises for this as well, which seem to be helping.  The Medrol Dosepaks seem to help.  She is not currently on any medications for this.     On 11/11/2024, she presented for follow-up of her right lateral epicondylitis.  She states that she is feeling better regarding her right elbow.  States the pain is almost resolved.  She has been doing well with occupational therapy and home exercises.  She was wearing her wrist brace and forearm strap.  She denies any interval falls or injuries.  However, unfortunately, she has developed a numbness and tingling into the pinky and ring finger of the right hand as well.  She also has some of the paresthesias into the dorsum of the hand.  She denies any significant neck pain.  He denies any issues with the neck in the past and denies any surgeries.    On 10/21/2024, she presented for follow-up of her right elbow lateral epicondylitis.  She states that as she has returned to more of her normal activities, she has noticed a return of some of the pain.  She denies any interval falls or injuries.  She denies any swelling or bruising.  She does get some occasional numbness and tingling, but this is not persistent.  She has been doing her occupational therapy and home  exercises.  She is no longer taking the naproxen consistently.    On 9/24/24, she presented for follow-up of her right lateral epicondylitis, right triceps tendinitis, and right biceps tendinitis.  States that she is feeling about 75% better.  She still has some remaining pain at the lateral elbow, but the biceps and triceps are feeling better overall.  She states that the brace, NSAIDs, ice, and rest have helped.  She had her consultation with physical therapy, but has her first official session today.  She denies any interval falls or injuries.  She denies any swelling or bruising.  She denies any numbness and tingling.    On 9/3/2024, she presented with new complaints of right elbow pain.  She is right-hand dominant.  She states that in June she started going to the gym and began noticing some lateral elbow pain.  She used a forearm strap, which helped improve this pain, but over the past 2 to 3 weeks she has been doing a lot of home projects including tearing up some floors with a crowbar, which led to worsening pain in the elbow, but yesterday she was scraping out some floor using a different motion and began having some anterior arm pain.  She denies feeling any pop.  She denies any swelling and bruising.  She denies any numbness and tingling.  She has been trying ice, forearm strap, and rest.  She denies any falls or injuries.    Plan: Today, on 1/3/2025, we will have her continue with her PT and home exercise program and follow-up as needed.    On 11/11/2024, for this right cervical radiculopathy, we will have her start physical therapy, start a Medrol Dosepak, and follow-up in 4 to 5 weeks.  Consider an MRI if she is not better.  Continue conservative treatment measures for her right lateral epicondylitis that is improving, and she can return to activities as tolerated.    On 10/21/2024, we will have her continue with her occupational therapy, home exercises, or forearm strap, we will add a wrist brace to  settle down her extensor movements, restart her naproxen for about a week, and will ice/heat.  Hopefully this conservative treatment route leads to more improvement, but if not, we will consider corticosteroid injection at the next appointment.  Follow-up in 3 to 4 weeks.    9/24/2024, we will have her continue with her occupational therapy, rest, ice, the forearm strap, NSAIDs as needed, and will follow-up in about 4 to 5 weeks see how she is doing.    On 9/3/2024, for this right lateral epicondylitis, right triceps tendinitis, and right biceps tendinitis, we will start her in physical therapy, return to the forearm strap, start her on naproxen, continue with compression, ice, rest, activity modification, follow-up in 3 to 4 weeks.      Assessment:   Problem List Items Addressed This Visit       Right elbow pain    Acute pain of right shoulder     Other Visit Diagnoses       Lateral epicondylitis of right elbow    -  Primary    Cervical radiculopathy                      Diagnostics: Reviewed all relevant imaging including x-ray, MRI, CT, and US.      Procedure:  Procedures    Physical Exam:  GENERAL:  No obvious acute distress.  NEURO:  Distally neurovascularly intact.  Sensation intact to light touch.  Extremity: Exam of the right elbow:  Skin is intact with no signs of infection;  No swelling or bruising;  No erythema or warmth;  No pain today over the distal triceps and the proximal biceps long head tendon;  No pain overlying the lateral epicondyle;  No tenderness overlying the medial epicondyle;  No pain with resisted extension at the wrist;  No pain with resisted supination;  No pain with flexion;  Negative hook test;    Exam of the cervical spine:  No tenderness along the midline of the cervical spine;  No step-offs along the midline of the cervical spine;  Minimally TENDER into the right trapezius muscle;  Flexion is full with no pain;  Extension is full with no pain;  Sidebending is full and symmetric with  no pain;  Rotation is full and symmetric with no pain;  Negative Spurling's on the right;  Negative Spurling's on the left;  Full strength and range of motion throughout the bilateral upper extremities; and  Nonantalgic gait.    No orders of the defined types were placed in this encounter.     At the conclusion of the visit there were no further questions by the patient/family regarding their plan of care.  Patient was instructed to call or return with any issues, questions, or concerns regarding their injury and/or treatment plan described above.     01/03/25 at 1:50 PM - Stef Higgins, DO    Office: [phone]    This note was prepared using voice recognition software.  The details of this note are correct and have been reviewed, and corrected to the best of my ability.  Some grammatical errors may persist related to the Dragon software.

## 2025-01-06 ENCOUNTER — TREATMENT (OUTPATIENT)
Dept: PHYSICAL THERAPY | Facility: HOSPITAL | Age: 55
End: 2025-01-06
Payer: COMMERCIAL

## 2025-01-06 DIAGNOSIS — M25.511 ACUTE PAIN OF RIGHT SHOULDER: ICD-10-CM

## 2025-01-06 DIAGNOSIS — M25.521 RIGHT ELBOW PAIN: ICD-10-CM

## 2025-01-06 PROCEDURE — 97110 THERAPEUTIC EXERCISES: CPT | Mod: GP

## 2025-01-06 PROCEDURE — 97140 MANUAL THERAPY 1/> REGIONS: CPT | Mod: GP

## 2025-01-06 NOTE — PROGRESS NOTES
Physical Therapy Treatment    Patient Name: Sari Spaulding  MRN: [id number]  Today's Date: 1/6/2025     PT Therapeutic Procedures Time Entry  Manual Therapy Time Entry: 20  Therapeutic Exercise Time Entry: 23                   Current Problem  1. Right elbow pain  Follow Up In Physical Therapy      2. Acute pain of right shoulder  Follow Up In Physical Therapy          Insurance   MMO [employer] 60V PT OT COPAY 0 DED 0 COVERAGE 100 OOP [phone] NO AUTH REQ REF     Visit #10    Subjective     Elbow is still a little stiff, currently 4/10. Hasn't been doing much lifting lately.   Neck has been okay and not really hurting- just stiff. Stiffness improved for about 24 hours after last session.     Pain         Objective       Treatments:    TherEx: 23 min   UE ergometer 3F/3R level 2    Bicep curls with 5# DB x10   Zottman curls with 2# DB x10   Hammer curls with 5# DB x10   Reverse curls with 2# DB x10 R   Wrist extension with 2# DB x10   Wrist pronation/supination with 2# DB x10 R       Manual: 20 min   TrP release and cupping to R wrist extensors, triceps and brachialis. + response noted with reduction in pain.   Manual cervical PROM and gentle traction     Assessment:  Pt continues to report + response to manual therapy with reduction in s/s especially with TrP release. Pt educated on adding zottman curls to improve brachialis strength for reduced pinpoint pain.     Plan:  Postural strengthening   Pec stretching    Response to TrP releases?     HEP:   Access Code: [email or web address]  URL: [email or web address]  Date: 12/30/2024  Prepared by: Saima Zamarripa    Exercises  - Thoracic Extension Mobilization on Foam Roll  - 1 x daily - 7 x weekly - 2 sets - 15 reps  - Thoracic Mobilization on Foam Roll  - 1 x daily - 7 x weekly - 2 sets - 15 reps  - Sidelying Thoracic Rotation with Open Book  - 1 x daily - 7 x weekly - 2 sets - 15 reps  - Cervical Extension Prone on Elbows  - 1 x daily - 7 x  weekly - 2 sets - 10 reps  - Cervical Rotation Prone on Elbows  - 1 x daily - 7 x weekly - 2 sets - 10 reps  - Standing Shoulder Row with Anchored Resistance  - 1 x daily - 7 x weekly - 3 sets - 10 reps  - Shoulder extension with resistance - Neutral  - 1 x daily - 7 x weekly - 3 sets - 10 reps

## 2025-01-13 ENCOUNTER — APPOINTMENT (OUTPATIENT)
Dept: PHYSICAL THERAPY | Facility: HOSPITAL | Age: 55
End: 2025-01-13
Payer: COMMERCIAL

## 2025-01-13 ENCOUNTER — TELEPHONE (OUTPATIENT)
Dept: PHYSICAL THERAPY | Facility: HOSPITAL | Age: 55
End: 2025-01-13
Payer: COMMERCIAL

## 2025-01-13 DIAGNOSIS — M25.521 RIGHT ELBOW PAIN: ICD-10-CM

## 2025-01-13 DIAGNOSIS — M25.511 ACUTE PAIN OF RIGHT SHOULDER: ICD-10-CM

## 2025-01-13 NOTE — TELEPHONE ENCOUNTER
[facility] APPT CANC NOTICE FOR APPT SCHED 01/13/25 W/NO REASON. NO ACTION TAKEN; PT IS SCHED W/PROVIDER 01/20/25

## 2025-01-20 ENCOUNTER — TREATMENT (OUTPATIENT)
Dept: PHYSICAL THERAPY | Facility: HOSPITAL | Age: 55
End: 2025-01-20
Payer: COMMERCIAL

## 2025-01-20 DIAGNOSIS — M25.511 ACUTE PAIN OF RIGHT SHOULDER: ICD-10-CM

## 2025-01-20 DIAGNOSIS — M25.521 RIGHT ELBOW PAIN: ICD-10-CM

## 2025-01-20 PROCEDURE — 97110 THERAPEUTIC EXERCISES: CPT | Mod: GP,CQ

## 2025-01-20 PROCEDURE — 97140 MANUAL THERAPY 1/> REGIONS: CPT | Mod: GP,CQ

## 2025-01-20 ASSESSMENT — PAIN SCALES - GENERAL: PAINLEVEL_OUTOF10: 0 - NO PAIN

## 2025-01-20 ASSESSMENT — PAIN DESCRIPTION - DESCRIPTORS: DESCRIPTORS: TIGHTNESS

## 2025-01-20 ASSESSMENT — PAIN - FUNCTIONAL ASSESSMENT: PAIN_FUNCTIONAL_ASSESSMENT: 0-10

## 2025-01-20 NOTE — PROGRESS NOTES
Physical Therapy Treatment    Patient Name: Sari Spaulding  MRN: [id number]  Today's Date: 1/20/2025     PT Therapeutic Procedures Time Entry  Manual Therapy Time Entry: 15  Therapeutic Exercise Time Entry: 25                   Current Problem  1. Right elbow pain  Follow Up In Physical Therapy      2. Acute pain of right shoulder  Follow Up In Physical Therapy          Insurance   MMO SU[employer] 60V PT OT COPAY 0 DED 0 COVERAGE 100 OOP [phone] NO AUTH REQ REF     Visit #11        Subjective   Pt stating she is overall doing better, finished a jono project yesterday so elbow and neck just feel stiff    Pain  Pain Assessment: 0-10  0-10 (Numeric) Pain Score: 0 - No pain  Pain Descriptors: Tightness      Objective     Treatments:  UE ergometer 3F/3R level 2    Bicep curls with 5# DB x10   Zottman curls with 2# DB x10   Hammer curls with 5# DB x10   Reverse curls with 2# DB x10 R   Wrist extension with 2# DB x10   Wrist pronation/supination with 2# DB x10 R   PB wall push ups x10     Manual:   Manual cervical PROM and gentle traction   STM to wrist/forearm extensors    Assessment:  Continued with strengthening exercises with the addition of PB wall push ups which was challenging. Continues to feel good after manual with less stiffness reported.     Plan:  Cont to progress strength

## 2025-01-27 ENCOUNTER — TREATMENT (OUTPATIENT)
Dept: PHYSICAL THERAPY | Facility: HOSPITAL | Age: 55
End: 2025-01-27
Payer: COMMERCIAL

## 2025-01-27 DIAGNOSIS — M25.521 RIGHT ELBOW PAIN: ICD-10-CM

## 2025-01-27 DIAGNOSIS — M25.511 ACUTE PAIN OF RIGHT SHOULDER: ICD-10-CM

## 2025-01-27 PROCEDURE — 97140 MANUAL THERAPY 1/> REGIONS: CPT | Mod: GP,CQ

## 2025-01-27 PROCEDURE — 97110 THERAPEUTIC EXERCISES: CPT | Mod: GP,CQ

## 2025-01-27 ASSESSMENT — PAIN SCALES - GENERAL: PAINLEVEL_OUTOF10: 0 - NO PAIN

## 2025-01-27 ASSESSMENT — PAIN - FUNCTIONAL ASSESSMENT: PAIN_FUNCTIONAL_ASSESSMENT: 0-10

## 2025-01-27 NOTE — PROGRESS NOTES
Physical Therapy Treatment    Patient Name: Sari Spaulding  MRN: [id number]  Today's Date: 1/27/2025     PT Therapeutic Procedures Time Entry  Manual Therapy Time Entry: 10  Therapeutic Exercise Time Entry: 28                   Current Problem  1. Right elbow pain  Follow Up In Physical Therapy      2. Acute pain of right shoulder  Follow Up In Physical Therapy          Insurance   MMO SU[employer] MED 60V PT OT COPAY 0 DED 0 COVERAGE 100 OOP [phone] NO AUTH REQ REF     Visit #12        Subjective   Pt stating her elbow pain comes and goes but thinks a lot of it is due to how she sleeps.     Pain  Pain Assessment: 0-10  0-10 (Numeric) Pain Score: 0 - No pain      Objective     Treatments:  UE ergometer 3F/3R level 2    Bicep curls with 6# DB x10   Zottman curls with 3# DB x10   Hammer curls with 6# DB x10   Reverse curls with 2# DB x10 R   Forearm push ups from counter  PB wall push ups 2x10     Manual:   Manual cervical PROM and gentle traction   STM to wrist/forearm extensors    Assessment:  Continued with strengthening exercises increasing wt and reps with good tolerance. Pt displays improve PROM in her C spine with rotation bilaterally.     Plan:  Re check next visit

## 2025-01-29 ENCOUNTER — TELEPHONE (OUTPATIENT)
Dept: PHYSICAL THERAPY | Facility: HOSPITAL | Age: 55
End: 2025-01-29
Payer: COMMERCIAL

## 2025-01-29 DIAGNOSIS — M25.511 ACUTE PAIN OF RIGHT SHOULDER: ICD-10-CM

## 2025-01-29 DIAGNOSIS — M25.521 RIGHT ELBOW PAIN: ICD-10-CM

## 2025-01-29 NOTE — TELEPHONE ENCOUNTER
PROVIDER ADV HAS PELVIC FLOOR MEETING MON (02/03). PC TO PT TO ADV APPT MOVED TO NEW APPT TIME OF 02: 45P; LMOM TO ADV OF APPT CHANGE AND TO RTN CALL TO INFORM IF THAT DATE/TIME IS GOOD OR NEEDS TO BE RESCHED

## 2025-02-03 ENCOUNTER — TREATMENT (OUTPATIENT)
Dept: PHYSICAL THERAPY | Facility: HOSPITAL | Age: 55
End: 2025-02-03
Payer: COMMERCIAL

## 2025-02-03 DIAGNOSIS — M25.511 ACUTE PAIN OF RIGHT SHOULDER: ICD-10-CM

## 2025-02-03 DIAGNOSIS — M25.521 RIGHT ELBOW PAIN: ICD-10-CM

## 2025-02-03 PROCEDURE — 97110 THERAPEUTIC EXERCISES: CPT | Mod: GP

## 2025-02-03 NOTE — PROGRESS NOTES
Physical Therapy Treatment    Patient Name: Sari Spaulding  MRN: [id number]  Today's Date: 2/3/2025     PT Therapeutic Procedures Time Entry  Therapeutic Exercise Time Entry: 10  Therapeutic Activity Time Entry: 10                   Current Problem  1. Right elbow pain        2. Acute pain of right shoulder            Insurance   MMO SU[employer] 60V PT OT COPAY 0 DED 0 COVERAGE 100 OOP [phone] NO AUTH REQ REF     Visit #13        Subjective   Reports things have been going pretty well and is pretty manageable.     Pain         Objective   Posture: B rounded shoulders     AROM:  - Cervical Spine Flexion           WNL   - Cervical Spine Extension       WNL   - Cervical Spine Rotation          WNL B   - Cervical Spine Sidebending   L     40      R 40  - Shoulder Flexion                    L     WNL   R WNL  - Shoulder Extension               L     wnl      R WNL  - Shoulder ABD                       L    wnl       R WNL   - Shoulder ER                         WNL B   - Shoulder IR                           L   t11       R t11  - Elbow flexion                        L    wnl      R wnl   - Elbow extension                   L     0     R 0  Wrist ROM WNL      Strength   Cervical MMT WNL   - Shoulder Flexion                    L   5       R5  - Shoulder Extension               L     5     R5  - Shoulder ABD                       L     5     R5 with mild pain   - Shoulder ER                          L      5    R5  - Shoulder IR                           L      5    R5  - Elbow flexion                        L     5    R5  - Elbow extension                   L     5     R 5  - Forearm supination               L    5      R5  - forearm pronation                 L   5       R5  - wrist flexion                         L    5      R5  - wrist extension                    L     5     R5  - wrist radial deviation           L     5     R5  - wrist ulnar deviation            L     5     R5        Postural muscle strength:  (PER CLINICIAN ERROR- NOT ASSESSED ON 11/13)   - LT                                     L   4-       R 4-  - MT                                    L   4+       R 4+  - Rhomboids                       L    4+      R4  - Latissimus Dorsi               L    5      R 5     Special Tests:  Elbow     mills test (lateral epicondylitis)-  (-) R   / (-) L      medial epicondylitis test -  (-) R  / (-) L          Treatments:  TherEx: 10  Standing bent over IYT with 1# DBs x10 each  Standing bent over shoulder ext with 1# DB x10 B     TherAct: 10  Reassessment performed today to assess progress towards goals. Pt educated on findings of reassessment, plan of care, HEP and goals to promote pt progress for reduced s/s. See ortho section for updated objective measures.    Assessment:    Pt has attended 13 consecutive physical therapy sessions 2/2 R elbow and shoulder pain consistent with lateral epicondylitis, triceps and biceps tendinitis and mm spasm.   This pt has shown improvement in posture, strength, ROM and pain intensity/frequency.   This pt continues to show decreased : postural mm strength   Functional limitations include: NONE  Pt is being DC at this time 2/2 achieving all goals- pt agreeable.       Plan:  DC with HEP      Goals:  STGs: 4 weeks      Pt will demonstrate symmetrical AROM WNL without pain for unlimited ability to perform home household/recreational/occupational tasks and to allow for correct mechanics with functional mobility. GOAL MET     Pt will demonstrate good posture with <2 cues for correction during 45 minute treatment session in order to enhance body mechanics with ADLs, functional mobility, and recreational/occupational duties. GOAL MET      Pt will demonstrate independence and report compliance with HEP to facilitate independent rehab program upon discharge. GOAL MET      LTGs: 8 weeks      Pt will demonstrate increased strength in deficient muscles by 1/2 MMT grade to assist with improved  posture and reduced shoulder pain. GOAL MET      Pt will demonstrate (-) mill's test to demonstrate improved tissue mobility to allow for pulling items without elbow pain. GOAL NOT MET      Pt will demonstrate ability to lift 10# item with proper form without shoulder pain to allow for return to ADLs. GOAL MET     Pt will report improved QuickDASH score by 8 points (MCID) to demonstrate improved functional mobility and improved QOL. Baseline: 24  / GOAL MET - score of 15      NEW GOALS  as of 11/13/24:      Pt will report 75% improvement in neck pain, numbness, tingling sensations for improved QOL. GOAL MET      Pt will demonstrate improved NDI scores by 7 points to improve QOL and ability to perform functional mobility. Baseline: 6 points or 12% / GOAL FUNCTIONALLY MET

## 2025-02-12 DIAGNOSIS — F41.9 ANXIETY: ICD-10-CM

## 2025-02-18 RX ORDER — BUSPIRONE HYDROCHLORIDE 15 MG/1
15 TABLET ORAL 3 TIMES DAILY
Qty: 270 TABLET | Refills: 3 | Status: SHIPPED | OUTPATIENT
Start: 2025-02-18

## 2025-02-24 ENCOUNTER — APPOINTMENT (OUTPATIENT)
Dept: NEUROLOGY | Facility: CLINIC | Age: 55
End: 2025-02-24
Payer: COMMERCIAL

## 2025-02-24 VITALS
SYSTOLIC BLOOD PRESSURE: 106 MMHG | BODY MASS INDEX: 28.93 KG/M2 | HEIGHT: 66 IN | WEIGHT: 180 LBS | HEART RATE: 98 BPM | DIASTOLIC BLOOD PRESSURE: 64 MMHG

## 2025-02-24 DIAGNOSIS — G43.009 MIGRAINE WITHOUT AURA AND WITHOUT STATUS MIGRAINOSUS, NOT INTRACTABLE: Primary | ICD-10-CM

## 2025-02-24 DIAGNOSIS — M54.12 CERVICAL RADICULOPATHY, ACUTE: ICD-10-CM

## 2025-02-24 PROCEDURE — 1036F TOBACCO NON-USER: CPT | Performed by: PSYCHIATRY & NEUROLOGY

## 2025-02-24 PROCEDURE — 3008F BODY MASS INDEX DOCD: CPT | Performed by: PSYCHIATRY & NEUROLOGY

## 2025-02-24 PROCEDURE — 99214 OFFICE O/P EST MOD 30 MIN: CPT | Performed by: PSYCHIATRY & NEUROLOGY

## 2025-02-24 ASSESSMENT — ENCOUNTER SYMPTOMS
NECK PAIN: 0
HYPERACTIVE: 0
NUMBNESS: 0
JOINT SWELLING: 0
ADENOPATHY: 0
SPEECH DIFFICULTY: 0
WHEEZING: 0
DIFFICULTY URINATING: 0
UNEXPECTED WEIGHT CHANGE: 0
WEAKNESS: 0
FATIGUE: 0
EYE PAIN: 0
PHOTOPHOBIA: 0
ABDOMINAL PAIN: 0
SHORTNESS OF BREATH: 0
BRUISES/BLEEDS EASILY: 0
VOMITING: 0
TREMORS: 0
DIZZINESS: 1
AGITATION: 0
CONFUSION: 0
LIGHT-HEADEDNESS: 0
PALPITATIONS: 0
FEVER: 0
BACK PAIN: 0
TROUBLE SWALLOWING: 0
FACIAL ASYMMETRY: 0
HEADACHES: 1
NAUSEA: 0
NECK STIFFNESS: 0
HALLUCINATIONS: 0
SLEEP DISTURBANCE: 1
ARTHRALGIAS: 0
FREQUENCY: 0
COUGH: 0
SEIZURES: 0
SINUS PRESSURE: 0

## 2025-02-24 ASSESSMENT — PATIENT HEALTH QUESTIONNAIRE - PHQ9
2. FEELING DOWN, DEPRESSED OR HOPELESS: NOT AT ALL
SUM OF ALL RESPONSES TO PHQ9 QUESTIONS 1 & 2: 0
1. LITTLE INTEREST OR PLEASURE IN DOING THINGS: NOT AT ALL

## 2025-02-24 NOTE — PROGRESS NOTES
Sari Coronelchadr  54 y.o.       SUBJECTIVE  Sari is a 54-year-old young lady who was seen today for follow-up for recurrent migraine headache with multiple other medical issues.  Since last seen she has like about 2 or 3 headaches a month and due to with the change in weather or when she is sleep deprived the headache gets worse.  Today her physical and neurological examination was normal.  I would like to continue her Ubrelvy the way she is taking and I discussed the importance of sleep hygiene and diet which she understood and most of the headache occurs when she is not eating well.  Today her physical and neurological examination was normal.  I would like to continue all her medication the way she is taking and continue with multivitamin with B complex and see her back in 6 months    I did review the medication list.      Due to technical limitations of voice recognition and human error, this note may not accurately reflect the care of the patient.    Review of Systems   Constitutional:  Negative for fatigue, fever and unexpected weight change.   HENT:  Negative for dental problem, ear pain, hearing loss, sinus pressure, tinnitus and trouble swallowing.    Eyes:  Negative for photophobia, pain and visual disturbance.   Respiratory:  Negative for cough, shortness of breath and wheezing.    Cardiovascular:  Negative for chest pain, palpitations and leg swelling.   Gastrointestinal:  Negative for abdominal pain, nausea and vomiting.   Genitourinary:  Negative for difficulty urinating, enuresis and frequency.   Musculoskeletal:  Negative for arthralgias, back pain, joint swelling, neck pain and neck stiffness.   Skin:  Negative for pallor and rash.   Allergic/Immunologic: Negative for food allergies.   Neurological:  Positive for dizziness and headaches. Negative for tremors, seizures, syncope, facial asymmetry, speech difficulty, weakness, light-headedness and numbness.   Hematological:  Negative for adenopathy. Does  "not bruise/bleed easily.   Psychiatric/Behavioral:  Positive for sleep disturbance. Negative for agitation, behavioral problems, confusion and hallucinations. The patient is not hyperactive.         Patient Active Problem List   Diagnosis    Allergic dermatitis    Anxiety    Cervical radiculopathy, acute    Chondromalacia patellae    DJD (degenerative joint disease), ankle and foot    Frequent sinus infections    Hematuria    Iliotibial band syndrome    Joint pain, knee    Low back pain, episodic    Migraine without aura and without status migrainosus, not intractable    Moderate mixed hyperlipidemia not requiring statin therapy    Other specified hypothyroidism    PFS (patellofemoral syndrome)    Gallstones    Overweight with body mass index (BMI) of 26 to 26.9 in adult    Encephalopathy    Foot pain    Right elbow pain    Acute pain of right shoulder    Neck pain     Past Medical History:   Diagnosis Date    History of mammogram 2024    cat 1    Pap test, as part of routine gynecological examination 2023    wnl, hpv neg     Past Surgical History:   Procedure Laterality Date     SECTION, CLASSIC      x 2       reports that she has never smoked. She has never been exposed to tobacco smoke. She has never used smokeless tobacco. She reports that she does not currently use alcohol. She reports that she does not use drugs.    /64 (BP Location: Left arm, Patient Position: Sitting, BP Cuff Size: Adult)   Pulse 98   Ht 1.676 m (5' 6\")   Wt 81.6 kg (180 lb)   LMP 10/01/2020 (Approximate)   BMI 29.05 kg/m²     OBJECTIVE  Physical Exam/Neurological Exam   Constitutional: General appearance: no acute distress   Auscultation of Heart: Regular rate and rhythm, no murmurs, normal S1 and S2.   Carotid Arteries: Intact without any bruits.   Neck is supple.   No lymph adenopathy.   Peripheral Vascular Exam: Pulses +2 and equal in all extremities. No swelling, varicosities, edema or tenderness to " palpations.    Abdomen is soft, nondistended. No organomegaly.  Mental status: The patient was in no distress, alert, interactive and cooperative. Affect is appropriate.   Orientation: oriented to person, oriented to place and oriented to time.   Memory: recent memory intact and remote memory intact.   Attention: normal attention span and normal concentrating ability.   Language: normal comprehension and no difficulty naming common objects.   Fund of knowledge: Patient displays adequate knowledge of current events, adequate fund of knowledge regarding past history and adequate fund of knowledge regarding vocabulary.   Eyes: The ophthalmoscopic examination was normal. The fundi are visualized with normal disc margins and without.  Cranial nerve II: Visual fields full to confrontation.   Cranial nerves III, IV, and VI: Pupils round, equally reactive to light; no ptosis. EOMs intact. No nystagmus.   Cranial Nerve V: Facial sensation intact bilaterally.   Cranial nerve VII: Normal and symmetric facial strength.   Cranial nerve VIII: Hearing is intact bilaterally to finger rub / whisper.   Cranial nerves IX and X: Palate elevates symmetrically.   Cranial nerve XI: Shoulder shrug and neck rotation strength are intact.   Cranial nerve XII: Tongue midline with normal strength.   Motor: Motor exam was normal. Muscle bulk was normal in both upper and lower extremities. Muscle tone was normal in both upper and lower extremities. Muscle strength was 5/5 throughout. no abnormal or adventitious movements were present.   Deep Tendon Reflexes: left biceps 2+ , right biceps 2+, left triceps 2+, right triceps 2+, left brachioradialis 2+, right brachioradialis 2+, left patella 2+, right patella 2+, left ankle jerk 2+, right ankle jerk 2+   Plantar Reflex: Toes downgoing to plantar stimulation on the left. Toes downgoing to plantar stimulation on the right.   Sensory Exam: Normal to light touch.   Coordination: There is no limb  dystaxia and rapid alternating movements are intact.  Gait: Gait is normal without spasticity, ataxia or bradykinesia. Stance is stable with a negative Romberg.      ASSESSMENT/PLAN  Diagnoses and all orders for this visit:  Migraine without aura and without status migrainosus, not intractable  Cervical radiculopathy, acute        Quoc Escobar MD  2/24/2025  10:24 AM

## 2025-02-28 ENCOUNTER — OFFICE VISIT (OUTPATIENT)
Dept: ORTHOPEDIC SURGERY | Facility: CLINIC | Age: 55
End: 2025-02-28
Payer: COMMERCIAL

## 2025-02-28 ENCOUNTER — HOSPITAL ENCOUNTER (OUTPATIENT)
Dept: RADIOLOGY | Facility: CLINIC | Age: 55
Discharge: HOME | End: 2025-02-28
Payer: COMMERCIAL

## 2025-02-28 DIAGNOSIS — S50.12XA CONTUSION OF LEFT FOREARM, INITIAL ENCOUNTER: Primary | ICD-10-CM

## 2025-02-28 DIAGNOSIS — M79.602 LEFT ARM PAIN: ICD-10-CM

## 2025-02-28 PROCEDURE — 73090 X-RAY EXAM OF FOREARM: CPT | Mod: LT

## 2025-02-28 PROCEDURE — 1036F TOBACCO NON-USER: CPT | Performed by: FAMILY MEDICINE

## 2025-02-28 PROCEDURE — L3908 WHO COCK-UP NONMOLDE PRE OTS: HCPCS | Performed by: FAMILY MEDICINE

## 2025-02-28 PROCEDURE — 99213 OFFICE O/P EST LOW 20 MIN: CPT | Performed by: FAMILY MEDICINE

## 2025-02-28 NOTE — PROGRESS NOTES
Acute Injury New Patient Visit    CC:   Chief Complaint   Patient presents with    Left Arm - Pain       HPI: Sari is a 54 y.o.female who presents today with new complaints of acute pain discomfort to the left forearm.  She states last night when tried to take her dog outside she had been yanked on the leash arm slammed into the metal frame of the sliding door.  She noted some bruising and some swelling ice it a few times yesterday is not quite as bad as it was but does have some moderate 4-5 out of 10 pain at rest.  Denies any numbness tingling or burning.        Review of Systems   GENERAL: Negative for malaise, significant weight loss, fever  MUSCULOSKELETAL: See HPI  NEURO: Negative for numbness / tingling     Past Medical History  Past Medical History:   Diagnosis Date    History of mammogram 04/05/2024    cat 1    Pap test, as part of routine gynecological examination 04/2023    wnl, hpv neg       Medication review  Medication Documentation Review Audit       Reviewed by eJnni Barillas MA (Medical Assistant) on 02/28/25 at 0814      Medication Order Taking? Sig Documenting Provider Last Dose Status   atenolol (Tenormin) 50 mg tablet [id number] No Take 1 tablet (50 mg) by mouth once daily. Quoc Escobar MD Taking Active   busPIRone (Buspar) 15 mg tablet [id number]  TAKE 1 TABLET THREE TIMES A DAY MONTY Crabtree-CNP  Active   fluticasone (Flonase) 50 mcg/actuation nasal spray [id number] No in the morning. No instructions provided from TW abstraction. Shake gently. Before first use, prime pump. After use, clean tip and replace cap. Historical Provider, MD Taking Active   levothyroxine (Synthroid, Levoxyl) 50 mcg tablet [id number]  TAKE 1 TABLET DAILY Laurie Carvajal MD  Active   loratadine (Claritin) 10 mg tablet [id number] No Take 1 tablet (10 mg) by mouth once daily. Entered during TW abstraction, no dosage or instructions available Historical Provider, MD Taking Active   metFORMIN (Glucophage) 500  "mg tablet [id number] No Take 1 tablet (500 mg) by mouth once daily with breakfast. Sy Linares,  Taking Active   methylPREDNISolone (Medrol Dospak) 4 mg tablets [id number]  Follow schedule on package instructions Stef Higgins, DO  Active   multivitamin tablet [id number] No Take by mouth in the morning. No instructions from TW abstraction Historical Provider, MD Taking Active   Discontinued 02/24/25 0927   tretinoin (Retin-A) 0.05 % cream [id number] No APPLY TOPICALLY TO THE AFFECTED AREA EVERY DAY AT BEDTIME Laurie Carvajal MD Taking Active   ubrogepant (Ubrelvy) 100 mg tablet tablet [id number] No Take 1 tablet (100 mg) by mouth if needed (migraine). Quoc Escobar MD Taking Active                    Allergies  Allergies   Allergen Reactions    Levofloxacin GI Upset and Other     Felt \"spacy\"       Social History  Social History     Socioeconomic History    Marital status:      Spouse name: Not on file    Number of children: Not on file    Years of education: Not on file    Highest education level: Not on file   Occupational History    Not on file   Tobacco Use    Smoking status: Never     Passive exposure: Never    Smokeless tobacco: Never   Vaping Use    Vaping status: Never Used   Substance and Sexual Activity    Alcohol use: Not Currently     Comment: social    Drug use: Never    Sexual activity: Defer   Other Topics Concern    Not on file   Social History Narrative    Not on file     Social Drivers of Health     Financial Resource Strain: Not on file   Food Insecurity: Not on file   Transportation Needs: Not on file   Physical Activity: Not on file   Stress: Not on file (2/9/2021)   Social Connections: Not on file   Intimate Partner Violence: Low Risk  (4/16/2021)    [facility]    Intimate Partner Violence     Insults You: Not on file     Threatens You: Not on file     Screams at You: Not on file     Physically Hurt: Not on file     Intimate Partner Violence " Score: Not on file   Housing Stability: Not on file       Surgical History  Past Surgical History:   Procedure Laterality Date     SECTION, CLASSIC      x 2       Physical Exam:  GENERAL:  Patient is awake, alert, and oriented to person place and time.  Patient appears well nourished and well kept.  Affect Calm, Not Acutely Distressed.  HEENT:  Normocephalic, Atraumatic, EOMI  CARDIOVASCULAR:  Hemodynamically stable.  RESPIRATORY:  Normal respirations with unlabored breathing.  NEURO: Gross sensation intact to the upper extremities bilaterally.  Extremity: Left forearm demonstrate skin which is warm pink well-perfused small hematoma and bruise about the size of a playing card.  There is no brace or elevated skin there is no open cuts wounds or sores.   strength equal symmetric and intact no bony tenderness elicited at the distal radius or ulna she has 4-5 strength with resisted wrist extension 5 out of 5 wrist flexion full range of motion about the elbow no pronation or supination deficits no pain at the radial head.      Diagnostics: X-rays today negative for any obvious fracture dislocation        Procedure: None  Procedures    Assessment:   Problem List Items Addressed This Visit       Contusion of left forearm - Primary    Relevant Orders    Wrist brace     Other Visit Diagnoses       Left arm pain        Relevant Orders    XR forearm left 2 views             Plan: At this time we will offer the patient a trauma splint if she would like it for protection and a little bit of compression and support.  Additionally she can use a compression sleeve or Ace bandage.  Will plan to see her back as needed going forward recommended ice for the first 48 hours followed by moist heat keep on increasing range of motion activities as tolerated topical muscle rubs and creams can also be helpful.  To call or return with any worsening or persistent issues lasting 2 to 3 weeks from now.  Orders Placed This Encounter     Wrist brace    XR forearm left 2 views      At the conclusion of the visit there were no further questions by the patient/family regarding their plan of care.  Patient was instructed to call or return with any issues, questions, or concerns regarding their injury and/or treatment plan described above.     02/28/25 at 8:53 AM - Cole C Budinsky, MD    Office: [phone]    This note was prepared using voice recognition software.  The details of this note are correct and have been reviewed, and corrected to the best of my ability.  Some grammatical errors may persist related to the Dragon software.

## 2025-03-13 DIAGNOSIS — E66.3 OVERWEIGHT WITH BODY MASS INDEX (BMI) OF 26 TO 26.9 IN ADULT: ICD-10-CM

## 2025-03-13 RX ORDER — METFORMIN HYDROCHLORIDE 500 MG/1
500 TABLET ORAL
Qty: 90 TABLET | Refills: 0 | Status: SHIPPED | OUTPATIENT
Start: 2025-03-13

## 2025-03-27 LAB — NONINV COLON CA DNA+OCC BLD SCRN STL QL: NEGATIVE

## 2025-04-07 ENCOUNTER — APPOINTMENT (OUTPATIENT)
Dept: RADIOLOGY | Facility: HOSPITAL | Age: 55
End: 2025-04-07
Payer: COMMERCIAL

## 2025-04-07 DIAGNOSIS — Z12.31 SCREENING MAMMOGRAM FOR BREAST CANCER: ICD-10-CM

## 2025-04-10 ENCOUNTER — HOSPITAL ENCOUNTER (OUTPATIENT)
Dept: RADIOLOGY | Facility: HOSPITAL | Age: 55
Discharge: HOME | End: 2025-04-10
Payer: COMMERCIAL

## 2025-04-10 VITALS — WEIGHT: 175 LBS | BODY MASS INDEX: 28.25 KG/M2

## 2025-04-10 DIAGNOSIS — Z12.31 SCREENING MAMMOGRAM FOR BREAST CANCER: ICD-10-CM

## 2025-04-10 PROCEDURE — 77063 BREAST TOMOSYNTHESIS BI: CPT | Performed by: RADIOLOGY

## 2025-04-10 PROCEDURE — 77067 SCR MAMMO BI INCL CAD: CPT | Performed by: RADIOLOGY

## 2025-04-10 PROCEDURE — 77063 BREAST TOMOSYNTHESIS BI: CPT

## 2025-04-29 ENCOUNTER — APPOINTMENT (OUTPATIENT)
Dept: PRIMARY CARE | Facility: CLINIC | Age: 55
End: 2025-04-29
Payer: COMMERCIAL

## 2025-04-29 VITALS
SYSTOLIC BLOOD PRESSURE: 110 MMHG | HEART RATE: 90 BPM | BODY MASS INDEX: 28.45 KG/M2 | RESPIRATION RATE: 16 BRPM | DIASTOLIC BLOOD PRESSURE: 62 MMHG | WEIGHT: 177 LBS | OXYGEN SATURATION: 97 % | HEIGHT: 66 IN | TEMPERATURE: 97.1 F

## 2025-04-29 DIAGNOSIS — E03.8 OTHER SPECIFIED HYPOTHYROIDISM: ICD-10-CM

## 2025-04-29 DIAGNOSIS — E66.3 OVERWEIGHT WITH BODY MASS INDEX (BMI) OF 26 TO 26.9 IN ADULT: ICD-10-CM

## 2025-04-29 DIAGNOSIS — K90.49 MILK INTOLERANCE: Primary | ICD-10-CM

## 2025-04-29 DIAGNOSIS — E01.0 THYROMEGALY: ICD-10-CM

## 2025-04-29 DIAGNOSIS — F41.9 ANXIETY: ICD-10-CM

## 2025-04-29 DIAGNOSIS — Z00.00 ANNUAL PHYSICAL EXAM: ICD-10-CM

## 2025-04-29 DIAGNOSIS — E78.2 MODERATE MIXED HYPERLIPIDEMIA NOT REQUIRING STATIN THERAPY: ICD-10-CM

## 2025-04-29 DIAGNOSIS — N95.1 PERIMENOPAUSAL: ICD-10-CM

## 2025-04-29 PROCEDURE — 1036F TOBACCO NON-USER: CPT | Performed by: FAMILY MEDICINE

## 2025-04-29 PROCEDURE — 3008F BODY MASS INDEX DOCD: CPT | Performed by: FAMILY MEDICINE

## 2025-04-29 PROCEDURE — 99396 PREV VISIT EST AGE 40-64: CPT | Performed by: FAMILY MEDICINE

## 2025-04-29 RX ORDER — METFORMIN HYDROCHLORIDE 500 MG/1
500 TABLET ORAL
Qty: 90 TABLET | Refills: 3 | Status: SHIPPED | OUTPATIENT
Start: 2025-04-29

## 2025-04-29 ASSESSMENT — PROMIS GLOBAL HEALTH SCALE
RATE_QUALITY_OF_LIFE: VERY GOOD
RATE_PHYSICAL_HEALTH: GOOD
RATE_AVERAGE_FATIGUE: SEVERE
RATE_MENTAL_HEALTH: GOOD
RATE_AVERAGE_PAIN: 1
RATE_GENERAL_HEALTH: GOOD
CARRYOUT_PHYSICAL_ACTIVITIES: COMPLETELY
EMOTIONAL_PROBLEMS: SOMETIMES
CARRYOUT_SOCIAL_ACTIVITIES: VERY GOOD
RATE_SOCIAL_SATISFACTION: VERY GOOD

## 2025-04-29 NOTE — PROGRESS NOTES
"Subjective   Patient ID: Sari Spaulding is a 54 y.o. female who presents for   Chief Complaint   Patient presents with    Annual Exam    Hypothyroidism    Anxiety   Covid vax: x 2  Flu: declined  Shingles: UTD     CRC: cologuard due 3/2028  Mammogram: 4/2025  Pap: 4/2023  Lmp: n/a  Not many menopause sxs  Needs labs  Fatigue at times  Offered gi   Denies gb sxs    HPI  Problem List[1]    Surgical History[2]    Review of Systems  This patient has  NO history of seizures/ CAD or CVA    NO history of recent Covid nor flu symptoms,    NO Fever nor chills today,    NO Chest pain, shortness of breath nor paroxysmal nocturnal dyspnea,  NO Nausea, vomiting, nor diarrhea,  NO Hematochezia nor melena,  NO Dysuria, hematuria, nor new incontinence issues  NO new severe headaches nor neurological complaints,  NO new issues with anxiety nor depression nor new psychiatric complaints,  NO suicidal nor homicidal ideations.     OBJECTIVE:  /62   Pulse 90   Temp 36.2 °C (97.1 °F) (Temporal)   Resp 16   Ht 1.676 m (5' 6\")   Wt 80.3 kg (177 lb)   LMP 10/01/2020 (Approximate)   SpO2 97%   BMI 28.57 kg/m²      General:  alert, oriented, no acute distress.  No obvious skin rashes noted.   No gait disturbance noted/baseline gait.    Mood is pleasant,  no signs of emotional distress.   Not appearing intoxicated or altered.   No voiced delusions,   Normal, appropriate behavior.    HEENT: Normocephalic, atraumatic,   Pupils round, reactive to light  Extraocular motions intact and wnl  Tympanic membranes normal    Neck: no nuchal rigidity  No masses palpable.  No carotid bruits.  Mild  thyromegaly.    Respiratory: Equal breath sounds  No wheezes,    rales,    nor rhonchi  No respiratory distress.    Heart: Regular rate and rhythm, no    murmurs  no rubs/gallops    Abdomen: no masses palpable, nontender, no rebound nor guarding.    Extremities: NO cyanosis noted, no clubbing.   No edema noted.  2+dorsalis pedis " pulses.    Normal-not antalgic, steady gait.    No visits with results within 3 Month(s) from this visit.   Latest known visit with results is:   Lab on 05/20/2024   Component Date Value Ref Range Status    WBC 05/20/2024 7.4  4.4 - 11.3 x10*3/uL Final    nRBC 05/20/2024 0.0  0.0 - 0.0 /100 WBCs Final    RBC 05/20/2024 4.52  4.00 - 5.20 x10*6/uL Final    Hemoglobin 05/20/2024 12.4  12.0 - 16.0 g/dL Final    Hematocrit 05/20/2024 37.9  36.0 - 46.0 % Final    MCV 05/20/2024 84  80 - 100 fL Final    MCH 05/20/2024 27.4  26.0 - 34.0 pg Final    MCHC 05/20/2024 32.7  32.0 - 36.0 g/dL Final    RDW 05/20/2024 14.3  11.5 - 14.5 % Final    Platelets 05/20/2024 273  150 - 450 x10*3/uL Final    Neutrophils % 05/20/2024 56.9  40.0 - 80.0 % Final    Immature Granulocytes %, Automated 05/20/2024 0.1  0.0 - 0.9 % Final    Immature Granulocyte Count (IG) includes promyelocytes, myelocytes and metamyelocytes but does not include bands. Percent differential counts (%) should be interpreted in the context of the absolute cell counts (cells/UL).    Lymphocytes % 05/20/2024 33.6  13.0 - 44.0 % Final    Monocytes % 05/20/2024 6.8  2.0 - 10.0 % Final    Eosinophils % 05/20/2024 1.8  0.0 - 6.0 % Final    Basophils % 05/20/2024 0.8  0.0 - 2.0 % Final    Neutrophils Absolute 05/20/2024 4.19  1.20 - 7.70 x10*3/uL Final    Percent differential counts (%) should be interpreted in the context of the absolute cell counts (cells/uL).    Immature Granulocytes Absolute, Au* 05/20/2024 0.01  0.00 - 0.70 x10*3/uL Final    Lymphocytes Absolute 05/20/2024 2.47  1.20 - 4.80 x10*3/uL Final    Monocytes Absolute 05/20/2024 0.50  0.10 - 1.00 x10*3/uL Final    Eosinophils Absolute 05/20/2024 0.13  0.00 - 0.70 x10*3/uL Final    Basophils Absolute 05/20/2024 0.06  0.00 - 0.10 x10*3/uL Final    Glucose 05/20/2024 98  74 - 99 mg/dL Final    Sodium 05/20/2024 141  136 - 145 mmol/L Final    Potassium 05/20/2024 4.1  3.5 - 5.3 mmol/L Final    Chloride 05/20/2024  108 (H)  98 - 107 mmol/L Final    Bicarbonate 05/20/2024 27  21 - 32 mmol/L Final    Anion Gap 05/20/2024 10  10 - 20 mmol/L Final    Urea Nitrogen 05/20/2024 25 (H)  6 - 23 mg/dL Final    Creatinine 05/20/2024 0.71  0.50 - 1.05 mg/dL Final    eGFR 05/20/2024 >90  >60 mL/min/1.73m*2 Final    Calculations of estimated GFR are performed using the 2021 CKD-EPI Study Refit equation without the race variable for the IDMS-Traceable creatinine methods.  https://jasn.asnjournals.org/content/early/2021/09/22/ASN.5748074275    Calcium 05/20/2024 8.7  8.6 - 10.3 mg/dL Final    Albumin 05/20/2024 4.2  3.4 - 5.0 g/dL Final    Alkaline Phosphatase 05/20/2024 54  33 - 110 U/L Final    Total Protein 05/20/2024 6.4  6.4 - 8.2 g/dL Final    AST 05/20/2024 17  9 - 39 U/L Final    Bilirubin, Total 05/20/2024 0.6  0.0 - 1.2 mg/dL Final    ALT 05/20/2024 15  7 - 45 U/L Final    Patients treated with Sulfasalazine may generate falsely decreased results for ALT.    Hemoglobin A1C 05/20/2024 5.4  see below % Final    Estimated Average Glucose 05/20/2024 108  Not Established mg/dL Final    Cholesterol 05/20/2024 208 (H)  0 - 199 mg/dL Final          Age      Desirable   Borderline High   High     0-19 Y     0 - 169       170 - 199     >/= 200    20-24 Y     0 - 189       190 - 224     >/= 225         >24 Y     0 - 199       200 - 239     >/= 240   **All ranges are based on fasting samples. Specific   therapeutic targets will vary based on patient-specific   cardiac risk.    Pediatric guidelines reference:Pediatrics 2011, 128(S5).Adult guidelines reference: NCEP ATPIII Guidelines,RAOUL 2001, 258:2486-97    Venipuncture immediately after or during the administration of Metamizole may lead to falsely low results. Testing should be performed immediately prior to Metamizole dosing.    HDL-Cholesterol 05/20/2024 78.4  mg/dL Final      Age       Very Low   Low     Normal    High    0-19 Y    < 35      < 40     40-45     ----  20-24 Y    ----     <  40      >45      ----        >24 Y      ----     < 40     40-60      >60      Cholesterol/HDL Ratio 05/20/2024 2.7   Final      Ref Values  Desirable  < 3.4  High Risk  > 5.0    LDL Calculated 05/20/2024 100 (H)  <=99 mg/dL Final                                Near   Borderline      AGE      Desirable  Optimal    High     High     Very High     0-19 Y     0 - 109     ---    110-129   >/= 130     ----    20-24 Y     0 - 119     ---    120-159   >/= 160     ----      >24 Y     0 -  99   100-129  130-159   160-189     >/=190      VLDL 05/20/2024 29  0 - 40 mg/dL Final    Triglycerides 05/20/2024 146  0 - 149 mg/dL Final       Age         Desirable   Borderline High   High     Very High   0 D-90 D    19 - 174         ----         ----        ----  91 D- 9 Y     0 -  74        75 -  99     >/= 100      ----    10-19 Y     0 -  89        90 - 129     >/= 130      ----    20-24 Y     0 - 114       115 - 149     >/= 150      ----         >24 Y     0 - 149       150 - 199    200- 499    >/= 500    Venipuncture immediately after or during the administration of Metamizole may lead to falsely low results. Testing should be performed immediately prior to Metamizole dosing.    Non HDL Cholesterol 05/20/2024 130  0 - 149 mg/dL Final          Age       Desirable   Borderline High   High     Very High     0-19 Y     0 - 119       120 - 144     >/= 145    >/= 160    20-24 Y     0 - 149       150 - 189     >/= 190      ----         >24 Y    30 mg/dL above LDL Cholesterol goal      Thyroid Stimulating Hormone 05/20/2024 1.37  0.44 - 3.98 mIU/L Final    Thyroxine, Free 05/20/2024 0.82  0.61 - 1.12 ng/dL Final        Assessment/Plan     Problem List Items Addressed This Visit       Anxiety    Relevant Orders    Comprehensive Metabolic Panel    CBC and Auto Differential    Lipid Panel    Hemoglobin A1C    Vitamin B12    Vitamin D 1,25 Dihydroxy (for eval of hypercalcemia)    Ferritin    Thyroid Stimulating Hormone    Thyroxine, Free     Moderate mixed hyperlipidemia not requiring statin therapy    Relevant Orders    Comprehensive Metabolic Panel    CBC and Auto Differential    Lipid Panel    Hemoglobin A1C    Vitamin B12    Vitamin D 1,25 Dihydroxy (for eval of hypercalcemia)    Ferritin    Thyroid Stimulating Hormone    Thyroxine, Free    Other specified hypothyroidism    Relevant Orders    Comprehensive Metabolic Panel    CBC and Auto Differential    Lipid Panel    Hemoglobin A1C    Vitamin B12    Vitamin D 1,25 Dihydroxy (for eval of hypercalcemia)    Ferritin    Thyroid Stimulating Hormone    Thyroxine, Free    Overweight with body mass index (BMI) of 26 to 26.9 in adult    Relevant Medications    metFORMIN (Glucophage) 500 mg tablet    Other Relevant Orders    Comprehensive Metabolic Panel    CBC and Auto Differential    Lipid Panel    Hemoglobin A1C    Vitamin B12    Vitamin D 1,25 Dihydroxy (for eval of hypercalcemia)    Ferritin    Thyroid Stimulating Hormone    Thyroxine, Free    Cortisol     Other Visit Diagnoses         Milk intolerance    -  Primary    Relevant Orders    Whey IgE      Annual physical exam        Relevant Orders    Comprehensive Metabolic Panel    CBC and Auto Differential    Lipid Panel    Hemoglobin A1C    Vitamin B12    Vitamin D 1,25 Dihydroxy (for eval of hypercalcemia)    Ferritin    Thyroid Stimulating Hormone    Thyroxine, Free      Perimenopausal        Relevant Orders    Estradiol            Sari Spaulding -be aware that any referrals discussed should be placed today or tests/labs ordered should result in prompt scheduling today.   If not done today-then a phone call for scheduling is expected in a timely manner(within 2 weeks).   If testing is to be done-a result should be available to patient within 2 weeks time unless otherwise specified.   You, the patient or caregiver, are responsible for making sure what was discussed is actually scheduled and completed.  If suboptimal understanding of results of tests or  referral reason-a follow up appointment with me should be made.  If above does NOT occur-you are to connect with us for an explanation.    Follow up at next scheduled visit -as planned or directed today.  Sooner if new or unresolved issues of concern.    Sari Spaulding We know you have a choice for your health care, THANK YOU for choosing  and [facility].  We APPRECIATE YOU.  Sincerely,   Laurie Carvajal MD   (dr. Phillips)             [1]   Patient Active Problem List  Diagnosis    Allergic dermatitis    Anxiety    Cervical radiculopathy, acute    Chondromalacia patellae    DJD (degenerative joint disease), ankle and foot    Frequent sinus infections    Hematuria    Iliotibial band syndrome    Joint pain, knee    Low back pain, episodic    Migraine without aura and without status migrainosus, not intractable    Moderate mixed hyperlipidemia not requiring statin therapy    Other specified hypothyroidism    PFS (patellofemoral syndrome)    Gallstones    Overweight with body mass index (BMI) of 26 to 26.9 in adult    Encephalopathy    Foot pain    Right elbow pain    Acute pain of right shoulder    Neck pain    Contusion of left forearm   [2]   Past Surgical History:  Procedure Laterality Date     SECTION, CLASSIC      x 2     SECTION, LOW TRANSVERSE  2000 and 2003

## 2025-04-29 NOTE — PROGRESS NOTES
Covid vax: x 2  Flu: declined  Shingles: UTD    CRC: cologuard due 3/2028  Mammogram: 4/2025  Pap: 4/2023  Lmp: n/a

## 2025-04-30 ENCOUNTER — APPOINTMENT (OUTPATIENT)
Dept: RADIOLOGY | Facility: HOSPITAL | Age: 55
End: 2025-04-30
Payer: COMMERCIAL

## 2025-05-01 ENCOUNTER — HOSPITAL ENCOUNTER (OUTPATIENT)
Dept: RADIOLOGY | Facility: HOSPITAL | Age: 55
Discharge: HOME | End: 2025-05-01
Payer: COMMERCIAL

## 2025-05-01 DIAGNOSIS — E01.0 THYROMEGALY: ICD-10-CM

## 2025-05-01 PROCEDURE — 76536 US EXAM OF HEAD AND NECK: CPT

## 2025-05-02 DIAGNOSIS — Z78.0 MENOPAUSE: Primary | ICD-10-CM

## 2025-05-02 RX ORDER — CONJUGATED ESTROGENS AND MEDROXYPROGESTERONE ACETATE .3; 1.5 MG/1; MG/1
1 TABLET, SUGAR COATED ORAL DAILY
Qty: 30 TABLET | Refills: 5 | Status: SHIPPED | OUTPATIENT
Start: 2025-05-02 | End: 2026-05-02

## 2025-05-03 LAB
1,25(OH)2D SERPL-MCNC: 43 PG/ML (ref 18–72)
1,25(OH)2D2 SERPL-MCNC: <8 PG/ML
1,25(OH)2D3 SERPL-MCNC: 43 PG/ML
ALBUMIN SERPL-MCNC: 4.3 G/DL (ref 3.6–5.1)
ALP SERPL-CCNC: 53 U/L (ref 37–153)
ALT SERPL-CCNC: 14 U/L (ref 6–29)
ANION GAP SERPL CALCULATED.4IONS-SCNC: 9 MMOL/L (CALC) (ref 7–17)
AST SERPL-CCNC: 16 U/L (ref 10–35)
BASOPHILS # BLD AUTO: 38 CELLS/UL (ref 0–200)
BASOPHILS NFR BLD AUTO: 0.8 %
BILIRUB SERPL-MCNC: 0.9 MG/DL (ref 0.2–1.2)
BUN SERPL-MCNC: 17 MG/DL (ref 7–25)
CALCIUM SERPL-MCNC: 9.2 MG/DL (ref 8.6–10.4)
CHLORIDE SERPL-SCNC: 106 MMOL/L (ref 98–110)
CHOLEST SERPL-MCNC: 256 MG/DL
CHOLEST/HDLC SERPL: 2.5 (CALC)
CO2 SERPL-SCNC: 27 MMOL/L (ref 20–32)
CORTIS SERPL-MCNC: 10.4 MCG/DL
COW WHEY IGE QN: <0.1 KU/L
COW WHEY IGE RAST: 0
CREAT SERPL-MCNC: 0.76 MG/DL (ref 0.5–1.03)
EGFRCR SERPLBLD CKD-EPI 2021: 93 ML/MIN/1.73M2
EOSINOPHIL # BLD AUTO: 82 CELLS/UL (ref 15–500)
EOSINOPHIL NFR BLD AUTO: 1.7 %
ERYTHROCYTE [DISTWIDTH] IN BLOOD BY AUTOMATED COUNT: 13.3 % (ref 11–15)
EST. AVERAGE GLUCOSE BLD GHB EST-MCNC: 114 MG/DL
EST. AVERAGE GLUCOSE BLD GHB EST-SCNC: 6.3 MMOL/L
ESTRADIOL SERPL-MCNC: 16 PG/ML
FERRITIN SERPL-MCNC: 38 NG/ML (ref 16–232)
GLUCOSE SERPL-MCNC: 86 MG/DL (ref 65–99)
HBA1C MFR BLD: 5.6 %
HCT VFR BLD AUTO: 41.4 % (ref 35–45)
HDLC SERPL-MCNC: 104 MG/DL
HGB BLD-MCNC: 13.1 G/DL (ref 11.7–15.5)
LDLC SERPL CALC-MCNC: 137 MG/DL (CALC)
LYMPHOCYTES # BLD AUTO: 1512 CELLS/UL (ref 850–3900)
LYMPHOCYTES NFR BLD AUTO: 31.5 %
MCH RBC QN AUTO: 26.9 PG (ref 27–33)
MCHC RBC AUTO-ENTMCNC: 31.6 G/DL (ref 32–36)
MCV RBC AUTO: 85 FL (ref 80–100)
MONOCYTES # BLD AUTO: 341 CELLS/UL (ref 200–950)
MONOCYTES NFR BLD AUTO: 7.1 %
NEUTROPHILS # BLD AUTO: 2827 CELLS/UL (ref 1500–7800)
NEUTROPHILS NFR BLD AUTO: 58.9 %
NONHDLC SERPL-MCNC: 152 MG/DL (CALC)
PLATELET # BLD AUTO: 306 THOUSAND/UL (ref 140–400)
PMV BLD REES-ECKER: 9.1 FL (ref 7.5–12.5)
POTASSIUM SERPL-SCNC: 4.4 MMOL/L (ref 3.5–5.3)
PROT SERPL-MCNC: 6.5 G/DL (ref 6.1–8.1)
RBC # BLD AUTO: 4.87 MILLION/UL (ref 3.8–5.1)
SODIUM SERPL-SCNC: 142 MMOL/L (ref 135–146)
T4 FREE SERPL-MCNC: 1.1 NG/DL (ref 0.8–1.8)
TRIGL SERPL-MCNC: 49 MG/DL
TSH SERPL-ACNC: 1.83 MIU/L
VIT B12 SERPL-MCNC: 660 PG/ML (ref 200–1100)
WBC # BLD AUTO: 4.8 THOUSAND/UL (ref 3.8–10.8)

## 2025-05-29 ENCOUNTER — TELEPHONE (OUTPATIENT)
Dept: NEUROLOGY | Facility: CLINIC | Age: 55
End: 2025-05-29
Payer: COMMERCIAL

## 2025-05-29 NOTE — TELEPHONE ENCOUNTER
Prior authorization has been submitted via cover  meds for Ubrelvy  Key:[id number]    PA Case ID #: [id number]

## 2025-07-14 ENCOUNTER — HOSPITAL ENCOUNTER (OUTPATIENT)
Dept: RADIOLOGY | Facility: HOSPITAL | Age: 55
Discharge: HOME | End: 2025-07-14
Payer: COMMERCIAL

## 2025-07-14 ENCOUNTER — APPOINTMENT (OUTPATIENT)
Dept: PRIMARY CARE | Facility: CLINIC | Age: 55
End: 2025-07-14
Payer: COMMERCIAL

## 2025-07-14 VITALS
OXYGEN SATURATION: 98 % | BODY MASS INDEX: 26.68 KG/M2 | HEIGHT: 66 IN | WEIGHT: 166 LBS | HEART RATE: 90 BPM | SYSTOLIC BLOOD PRESSURE: 102 MMHG | DIASTOLIC BLOOD PRESSURE: 60 MMHG | TEMPERATURE: 97.3 F | RESPIRATION RATE: 16 BRPM

## 2025-07-14 DIAGNOSIS — L23.9 ALLERGIC DERMATITIS: ICD-10-CM

## 2025-07-14 DIAGNOSIS — G43.009 MIGRAINE WITHOUT AURA AND WITHOUT STATUS MIGRAINOSUS, NOT INTRACTABLE: ICD-10-CM

## 2025-07-14 DIAGNOSIS — F41.9 ANXIETY: ICD-10-CM

## 2025-07-14 DIAGNOSIS — R07.81 RIB PAIN ON LEFT SIDE: Primary | ICD-10-CM

## 2025-07-14 DIAGNOSIS — E78.2 MODERATE MIXED HYPERLIPIDEMIA NOT REQUIRING STATIN THERAPY: ICD-10-CM

## 2025-07-14 DIAGNOSIS — E03.8 OTHER SPECIFIED HYPOTHYROIDISM: ICD-10-CM

## 2025-07-14 DIAGNOSIS — R07.81 RIB PAIN ON LEFT SIDE: ICD-10-CM

## 2025-07-14 PROCEDURE — 71101 X-RAY EXAM UNILAT RIBS/CHEST: CPT | Mod: LT

## 2025-07-14 PROCEDURE — 1036F TOBACCO NON-USER: CPT | Performed by: FAMILY MEDICINE

## 2025-07-14 PROCEDURE — 71101 X-RAY EXAM UNILAT RIBS/CHEST: CPT | Mod: LEFT SIDE | Performed by: RADIOLOGY

## 2025-07-14 PROCEDURE — 99214 OFFICE O/P EST MOD 30 MIN: CPT | Performed by: FAMILY MEDICINE

## 2025-07-14 PROCEDURE — 3008F BODY MASS INDEX DOCD: CPT | Performed by: FAMILY MEDICINE

## 2025-07-14 RX ORDER — TRETINOIN 0.5 MG/G
CREAM TOPICAL
Qty: 135 G | Refills: 1 | Status: SHIPPED | OUTPATIENT
Start: 2025-07-14

## 2025-07-14 ASSESSMENT — PATIENT HEALTH QUESTIONNAIRE - PHQ9
2. FEELING DOWN, DEPRESSED OR HOPELESS: NOT AT ALL
SUM OF ALL RESPONSES TO PHQ9 QUESTIONS 1 AND 2: 0
1. LITTLE INTEREST OR PLEASURE IN DOING THINGS: NOT AT ALL

## 2025-07-14 NOTE — PROGRESS NOTES
Covid vax: x2  Flu: n/a  Shingles: UTD    CRC: cologuard due 3/2028  Mammogram: 4/10/2025  Pap: 4/2023  Lmp: n/a

## 2025-07-14 NOTE — PROGRESS NOTES
"Subjective   Patient ID: Sari Spaulding is a 54 y.o. female who presents for   Chief Complaint   Patient presents with    rib pain     Lower left rib gets achy and sometimes catches on something in abdomen--comes and goes over the past month or so-no known injury     Covid vax: x2  Flu: n/a  Shingles: UTD     CRC: cologuard due 3/2028  Mammogram: 4/10/2025  Pap: 4/2023  Lmp: n/a    HPI  Problem List[1]    Surgical History[2]    Review of Systems  This patient has  NO history of seizures/ CAD or CVA    NO history of recent Covid nor flu symptoms,    NO Fever nor chills today,    NO Chest pain, shortness of breath nor paroxysmal nocturnal dyspnea,  NO Nausea, vomiting, nor diarrhea,  NO Hematochezia nor melena,  NO Dysuria, hematuria, nor new incontinence issues  NO new severe headaches nor neurological complaints,  NO new issues with anxiety nor depression nor new psychiatric complaints,  NO suicidal nor homicidal ideations.     OBJECTIVE:  /60   Pulse 90   Temp 36.3 °C (97.3 °F) (Temporal)   Resp 16   Ht 1.676 m (5' 6\")   Wt 75.3 kg (166 lb)   LMP 10/01/2020 (Approximate)   SpO2 98%   BMI 26.79 kg/m²      General:  alert, oriented, no acute distress.  No obvious skin rashes noted.       Mood is pleasant,  no signs of emotional distress.   Not appearing intoxicated or altered.   No voiced delusions,   Normal, appropriate behavior.    HEENT: Normocephalic, atraumatic,   Pupils round, reactive to light  Extraocular motions intact and wnl  Tympanic membranes normal    Neck: no nuchal rigidity  No masses palpable.  No carotid bruits.  No thyromegaly.    Respiratory: Equal breath sounds  No wheezes,    rales,    nor rhonchi  No respiratory distress.    Heart: Regular rate and rhythm, no    murmurs  no rubs/gallops    Abdomen: no masses palpable, nontender, no rebound nor guarding.    Extremities: NO cyanosis noted, no clubbing.   No edema noted.  2+dorsalis pedis pulses.    Normal-not antalgic, steady " gait.  No rib pain to palpation  Sub q nodules 1-2mm vasc calcifications vs cyst      Office Visit on 04/29/2025   Component Date Value Ref Range Status    GLUCOSE 04/29/2025 86  65 - 99 mg/dL Final    Comment:               Fasting reference interval         UREA NITROGEN (BUN) 04/29/2025 17  7 - 25 mg/dL Final    CREATININE 04/29/2025 0.76  0.50 - 1.03 mg/dL Final    EGFR 04/29/2025 93  > OR = 60 mL/min/1.73m2 Final    SODIUM 04/29/2025 142  135 - 146 mmol/L Final    POTASSIUM 04/29/2025 4.4  3.5 - 5.3 mmol/L Final    CHLORIDE 04/29/2025 106  98 - 110 mmol/L Final    CARBON DIOXIDE 04/29/2025 27  20 - 32 mmol/L Final    ELECTROLYTE BALANCE 04/29/2025 9  7 - 17 mmol/L (calc) Final    CALCIUM 04/29/2025 9.2  8.6 - 10.4 mg/dL Final    PROTEIN, TOTAL 04/29/2025 6.5  6.1 - 8.1 g/dL Final    ALBUMIN 04/29/2025 4.3  3.6 - 5.1 g/dL Final    BILIRUBIN, TOTAL 04/29/2025 0.9  0.2 - 1.2 mg/dL Final    ALKALINE PHOSPHATASE 04/29/2025 53  37 - 153 U/L Final    AST 04/29/2025 16  10 - 35 U/L Final    ALT 04/29/2025 14  6 - 29 U/L Final    WHITE BLOOD CELL COUNT 04/29/2025 4.8  3.8 - 10.8 Thousand/uL Final    RED BLOOD CELL COUNT 04/29/2025 4.87  3.80 - 5.10 Million/uL Final    HEMOGLOBIN 04/29/2025 13.1  11.7 - 15.5 g/dL Final    HEMATOCRIT 04/29/2025 41.4  35.0 - 45.0 % Final    MCV 04/29/2025 85.0  80.0 - 100.0 fL Final    MCH 04/29/2025 26.9 (L)  27.0 - 33.0 pg Final    MCHC 04/29/2025 31.6 (L)  32.0 - 36.0 g/dL Final    Comment: For adults, a slight decrease in the calculated MCHC  value (in the range of 30 to 32 g/dL) is most likely  not clinically significant; however, it should be  interpreted with caution in correlation with other  red cell parameters and the patient's clinical  condition.      RDW 04/29/2025 13.3  11.0 - 15.0 % Final    PLATELET COUNT 04/29/2025 306  140 - 400 Thousand/uL Final    MPV 04/29/2025 9.1  7.5 - 12.5 fL Final    ABSOLUTE NEUTROPHILS 04/29/2025 2,827  1,500 - 7,800 cells/uL Final    ABSOLUTE  LYMPHOCYTES 04/29/2025 1,512  850 - 3,900 cells/uL Final    ABSOLUTE MONOCYTES 04/29/2025 341  200 - 950 cells/uL Final    ABSOLUTE EOSINOPHILS 04/29/2025 82  15 - 500 cells/uL Final    ABSOLUTE BASOPHILS 04/29/2025 38  0 - 200 cells/uL Final    NEUTROPHILS 04/29/2025 58.9  % Final    LYMPHOCYTES 04/29/2025 31.5  % Final    MONOCYTES 04/29/2025 7.1  % Final    EOSINOPHILS 04/29/2025 1.7  % Final    BASOPHILS 04/29/2025 0.8  % Final    CHOLESTEROL, TOTAL 04/29/2025 256 (H)  <200 mg/dL Final    HDL CHOLESTEROL 04/29/2025 104  > OR = 50 mg/dL Final    TRIGLYCERIDES 04/29/2025 49  <150 mg/dL Final    LDL-CHOLESTEROL 04/29/2025 137 (H)  mg/dL (calc) Final    Comment: Reference range: <100     Desirable range <100 mg/dL for primary prevention;    <70 mg/dL for patients with CHD or diabetic patients   with > or = 2 CHD risk factors.     LDL-C is now calculated using the Alfonso-Chloe   calculation, which is a validated novel method providing   better accuracy than the Friedewald equation in the   estimation of LDL-C.   Alfonso SS et al. RAOUL. 2013;310(19): 5876-1910   (http://education.[employer]/faq/QFB852)      CHOL/HDLC RATIO 04/29/2025 2.5  <5.0 (calc) Final    NON HDL CHOLESTEROL 04/29/2025 152 (H)  <130 mg/dL (calc) Final    Comment: For patients with diabetes plus 1 major ASCVD risk   factor, treating to a non-HDL-C goal of <100 mg/dL   (LDL-C of <70 mg/dL) is considered a therapeutic   option.      HEMOGLOBIN A1c 04/29/2025 5.6  <5.7 % Final    Comment: For the purpose of screening for the presence of  diabetes:     <5.7%       Consistent with the absence of diabetes  5.7-6.4%    Consistent with increased risk for diabetes              (prediabetes)  > or =6.5%  Consistent with diabetes     This assay result is consistent with a decreased risk  of diabetes.     Currently, no consensus exists regarding use of  hemoglobin A1c for diagnosis of diabetes in children.     According to American Diabetes  Association (ADA)  guidelines, hemoglobin A1c <7.0% represents optimal  control in non-pregnant diabetic patients. Different  metrics may apply to specific patient populations.   Standards of Medical Care in Diabetes(ADA).          eAG (mg/dL) 04/29/2025 114  mg/dL Final    eAG (mmol/L) 04/29/2025 6.3  mmol/L Final    VITAMIN B12 04/29/2025 660  200 - 1,100 pg/mL Final    VITAMIN D, 1,25 (OH)2, TOTAL 04/29/2025 43  18 - 72 pg/mL Final    VITAMIN D3, 1,25 (OH)2 04/29/2025 43  pg/mL Final    VITAMIN D2, 1,25 (OH)2 04/29/2025 <8  pg/mL Final    Comment:    Vitamin D3, 1,25(OH)2 indicates both endogenous  production and supplementation. Vitamin D2, 1,25(OH)2  is an indicator of exogenous sources, such as diet or  supplementation.  Interpretation and therapy are based  on measurement of Vitamin D,1,25(OH)2, Total.        This test was developed and its analytical  performance characteristics have been determined  by [employer], [place], [place].  It has not been cleared or approved by the FDA. This  assay has been validated pursuant to the CLIA  regulations and is used for clinical purposes.         FERRITIN 04/29/2025 38  16 - 232 ng/mL Final    TSH 04/29/2025 1.83  mIU/L Final    Comment:           Reference Range                         > or = 20 Years  0.40-4.50                              Pregnancy Ranges            First trimester    0.26-2.66            Second trimester   0.55-2.73            Third trimester    0.43-2.91      T4, FREE 04/29/2025 1.1  0.8 - 1.8 ng/dL Final    CORTISOL, TOTAL 04/29/2025 10.4  mcg/dL Final    Comment: Reference Range: For 8 a.m.(7-9 a.m.) Specimen: 4.0-22.0  Reference Range: For 4 p.m.(3-5 p.m.) Specimen: 3.0-17.0    * Please interpret above results accordingly *         WHEY (F236) IGE 04/29/2025 <0.10  kU/L Final    CLASS 04/29/2025 0   Final    Comment: INTERPRETATION    SPECIFIC                  LEVEL OF ALLERGEN     IGE CLASS     kU/L      SPECIFIC IGE  ANTIBODY    --------   ---------    ----------------       0           <0.10     ABSENT/UNDETECTABLE       0/1     0.10-0.34     VERY LOW LEVEL       1       0.35-0.69     LOW LEVEL       2       0.70-3.49     MODERATE LEVEL       3       3.50-17.4     HIGH LEVEL       4       17.5-49.9     VERY HIGH LEVEL       5               VERY HIGH LEVEL       6       >100          VERY HIGH LEVEL     The clinical relevance of allergen results of 0.10-0.34 kU/L  are undetermined and intended for specialist use.      ESTRADIOL 04/29/2025 16  pg/mL Final    Comment:       Reference Range          Follicular Phase:              Mid-Cycle:                     Luteal Phase:                  Postmenopausal:      < or = 31            Reference range established on post-pubertal patient  population. No pre-pubertal reference range  established using this assay. For any patients for  whom low Estradiol levels are anticipated (e.g. males,  pre-pubertal children and hypogonadal/post-menopausal   females), the [employer] [facility]  Estradiol, Ultrasensitive, LCMSMS assay is recommended  (order code 58823).      Please note: patients being treated with the drug   fulvestrant (Faslodex(R)) have demonstrated significant   interference in immunoassay methods for estradiol   measurement. The cross reactivity could lead to falsely   elevated estradiol test results leading to an   inappropriate clinical assessment of estrogen status.  vide[employer]me order code 30289-Estradiol,   Ultrasensitive LC/MS/MS demonstrates negligible cr                           oss   reactivity with fulvestrant.          Assessment/Plan     Problem List Items Addressed This Visit       Allergic dermatitis    Relevant Medications    tretinoin (Retin-A) 0.05 % cream    Anxiety    Migraine without aura and without status migrainosus, not intractable    Moderate mixed hyperlipidemia not requiring statin therapy    Other specified  hypothyroidism       Sari Spaulding -be aware that any referrals discussed should be placed today or tests/labs ordered should result in prompt scheduling today.   If not done today-then a phone call for scheduling is expected in a timely manner(within 2 weeks).   If testing is to be done-a result should be available to patient within 2 weeks time unless otherwise specified.   You, the patient or caregiver, are responsible for making sure what was discussed is actually scheduled and completed.  If suboptimal understanding of results of tests or referral reason-a follow up appointment with me should be made.  If above does NOT occur-you are to connect with us for an explanation.    Follow up at next scheduled visit -as planned or directed today.  Sooner if new or unresolved issues of concern.    Sari Spaulding We know you have a choice for your health care, THANK YOU for choosing  and [facility].  We APPRECIATE YOU.  Sincerely,   Laurie Carvajal MD   (dr. Phillips)             [1]   Patient Active Problem List  Diagnosis    Allergic dermatitis    Anxiety    Cervical radiculopathy, acute    Chondromalacia patellae    DJD (degenerative joint disease), ankle and foot    Frequent sinus infections    Hematuria    Iliotibial band syndrome    Joint pain, knee    Low back pain, episodic    Migraine without aura and without status migrainosus, not intractable    Moderate mixed hyperlipidemia not requiring statin therapy    Other specified hypothyroidism    PFS (patellofemoral syndrome)    Gallstones    Overweight with body mass index (BMI) of 26 to 26.9 in adult    Encephalopathy    Foot pain    Right elbow pain    Acute pain of right shoulder    Neck pain    Contusion of left forearm   [2]   Past Surgical History:  Procedure Laterality Date     SECTION, CLASSIC      x 2     SECTION, LOW TRANSVERSE  2000 and 2003

## 2025-07-29 ENCOUNTER — APPOINTMENT (OUTPATIENT)
Dept: ORTHOPEDIC SURGERY | Facility: CLINIC | Age: 55
End: 2025-07-29
Payer: COMMERCIAL

## 2025-07-29 DIAGNOSIS — M72.2 PLANTAR FASCIITIS OF RIGHT FOOT: ICD-10-CM

## 2025-07-29 PROCEDURE — 99214 OFFICE O/P EST MOD 30 MIN: CPT | Performed by: INTERNAL MEDICINE

## 2025-07-29 PROCEDURE — 1036F TOBACCO NON-USER: CPT | Performed by: INTERNAL MEDICINE

## 2025-07-29 NOTE — PROGRESS NOTES
CC:   No chief complaint on file.      HPI: Sari is a 54 y.o. female presents today for evaluation for acute right heel pain which started two weeks ago. She states that she has right heel stabbing pain first step in the morning and also recently began running on the treadmill. She denies any fall or traumatic injury. She is here for initial evaluation and x-rays.         Review of Systems   GENERAL: Negative for malaise, significant weight loss, fever  MUSCULOSKELETAL: See HPI  NEURO:  Negative for numbness / tingling     Past Medical History  Medical History[1]    Medication review  Medication Documentation Review Audit       Reviewed by Shanell Crane MA (Medical Assistant) on 07/14/25 at 1122      Medication Order Taking? Sig Documenting Provider Last Dose Status   atenolol (Tenormin) 50 mg tablet [id number]  Take 1 tablet (50 mg) by mouth once daily. Quoc Escobar MD  Flag for Review   busPIRone (Buspar) 15 mg tablet [id number] Yes TAKE 1 TABLET THREE TIMES A DAY Aura Oakley APRN-CNP  Active   conj estrog-medroxyprogest ace (Prempro) 0.3-1.5 mg tablet [id number]  Take 1 tablet by mouth once daily. Laurie Carvajal MD  Flag for Review     Discontinued 07/14/25 1122   levothyroxine (Synthroid, Levoxyl) 50 mcg tablet [id number] Yes TAKE 1 TABLET DAILY Laurie Carvajal MD  Active   loratadine (Claritin) 10 mg tablet [id number] Yes Take 1 tablet (10 mg) by mouth once daily. Entered during TW abstraction, no dosage or instructions available Historical Provider, MD  Active   metFORMIN (Glucophage) 500 mg tablet [id number] Yes Take 1 tablet (500 mg) by mouth once daily with breakfast. Laurie Carvajal MD  Active   multivitamin tablet [id number] Yes Take by mouth in the morning. No instructions from TW abstraction Historical Provider, MD  Active   tretinoin (Retin-A) 0.05 % cream [id number] Yes APPLY TOPICALLY TO THE AFFECTED AREA EVERY DAY AT BEDTIME Laurie Carvajal MD  Active   ubrogepant (Ubrelvy)  100 mg tablet tablet [id number] Yes Take 1 tablet (100 mg) by mouth if needed (migraine). Quoc Escobar MD  Active                    Allergies  RX Allergies[2]    Social History  Social History     Socioeconomic History    Marital status:      Spouse name: Not on file    Number of children: Not on file    Years of education: Not on file    Highest education level: Not on file   Occupational History    Not on file   Tobacco Use    Smoking status: Never     Passive exposure: Never    Smokeless tobacco: Never   Vaping Use    Vaping status: Never Used   Substance and Sexual Activity    Alcohol use: Yes     Alcohol/week: 2.0 standard drinks of alcohol     Types: 2 Cans of beer per week     Comment: social    Drug use: Never    Sexual activity: Yes     Partners: Male   Other Topics Concern    Not on file   Social History Narrative    Not on file     Social Drivers of Health     Financial Resource Strain: Not on file   Food Insecurity: Not on file   Transportation Needs: Not on file   Physical Activity: Not on file   Stress: Not on file (2/9/2021)   Social Connections: Not on file   Intimate Partner Violence: Low Risk  (4/16/2021)    Received from [facility]    Intimate Partner Violence     Insults You: Not on file     Threatens You: Not on file     Screams at You: Not on file     Physically Hurt: Not on file     Intimate Partner Violence Score: Not on file   Housing Stability: Not on file       Surgical History  Surgical History[3]    Physical Exam:  GENERAL:  Patient is awake, alert, and oriented to person place and time.  Patient appears well nourished and well kept.  Affect Calm, Not Acutely Distressed.  HEENT:  Normocephalic, Atraumatic, EOMI  CARDIOVASCULAR:  Hemodynamically stable.  RESPIRATORY:  Normal respirations with unlabored breathing.  Extremity: Right foot examination shows skin is intact.  There is no erythema or warmth.  No obvious deformity.  There is no clinical signs of infection.  There  is no pain over the base of the fifth metatarsal bone.  There is no pain in the midfoot.  No pain over the metatarsal bones.  No pain of the cuboid bone.  There is  pain over the calcaneus at the origin of the plantar aponeurosis.  There is mild pain over the plantar aponeurosis at the arch of the foot.  There is no pain over the proximal phalanx of the right great toe.  No pain over distal phalanx of the right great toe.  Neurovascularly intact.      Diagnostics: Previous foot x-rays reviewed        Procedure: None    Assessment: Right foot plantar fasciitis    Plan: Sari presents today for evaluation for acute right heel stabbing pain which started two weeks ago secondary to plantar fasciitis.  Previous x-rays were reviewed.  Recommended physical therapy for her developing plantar fasciitis.  Activity modification.  If there is no improvement may consider custom orthotics or corticosteroid injection.  Good prognosis.      No orders of the defined types were placed in this encounter.     At the conclusion of the visit there were no further questions by the patient/family regarding their plan of care.  Patient was instructed to call or return with any issues, questions, or concerns regarding their injury and/or treatment plan described above.     07/29/25 at 9:15 AM - Ignacio Farr MD  Scribe Attestation  By signing my name below, Avery ALSTON Scribe   attest that this documentation has been prepared under the direction and in the presence of Ignacio Farr MD.    Office: [phone]    We already utilize the scribe attestation, Avery ALSTON am scribing for, and in the presence of Dr. Farr.    Ignacio ALSTON MD personally performed the services described in the documentation as scribed by Avery Phelps in my presence, and confirm it is both accurate and complete.    This note was prepared using voice recognition software.  The details of this note are correct and have been reviewed, and  "corrected to the best of my ability.  Some grammatical errors may persist related to the Dragon software.         [1]   Past Medical History:  Diagnosis Date    Anxiety     Colon cancer screening 2025    cologuard negative-due 3/2028    Headache     History of mammogram 04/10/2025    cat 1    Pap test, as part of routine gynecological examination 2023    wnl, hpv neg   [2]   Allergies  Allergen Reactions    Levofloxacin GI Upset and Other     Felt \"spacy\"   [3]   Past Surgical History:  Procedure Laterality Date     SECTION, CLASSIC      x 2     SECTION, LOW TRANSVERSE  2000 and 2003     "

## 2025-08-07 ENCOUNTER — EVALUATION (OUTPATIENT)
Dept: PHYSICAL THERAPY | Facility: HOSPITAL | Age: 55
End: 2025-08-07
Payer: COMMERCIAL

## 2025-08-07 DIAGNOSIS — M79.671 RIGHT FOOT PAIN: Primary | ICD-10-CM

## 2025-08-07 PROCEDURE — 97110 THERAPEUTIC EXERCISES: CPT | Mod: GP

## 2025-08-07 PROCEDURE — 97161 PT EVAL LOW COMPLEX 20 MIN: CPT | Mod: GP

## 2025-08-07 NOTE — PROGRESS NOTES
Physical Therapy Evaluation and Treatment    Patient Name: Sari Spaulding  MRN: [id number]  YOB: 1970  Encounter Date: 8/7/2025    Time Entry:  Time Calculation  Start Time: 1100  Stop Time: 1130  Time Calculation (min): 30 min  PT Evaluation Time Entry  PT Evaluation (Low) Time Entry: 20  PT Therapeutic Procedures Time Entry  Therapeutic Exercise Time Entry: 10                   Rehab Insurance Information:   Visit Count: 1  Auth Required: No        Additional Authorization/Insurance Information: MMO BMN     Rehab Falls Risk Assessment:  Fall Risk Indicated: No    Problem List Items Addressed This Visit           ICD-10-CM    Right foot pain - Primary M79.671    Relevant Orders    Follow Up In Physical Therapy            Subjective   Patient Education  Do you or those around you need extra help because of problems with hearing, speaking, seeing, moving around, or learning?: No  Are you comfortable filing out medical forms?: Yes  Key Learner  Key Learner: Patient  History of Present Illness  Sari is a 54 y.o. female who reports to physical therapy with a chief concern of R foot pain.                 History of Present Condition/Illness: R sided plantar fasciitis after beginning running on TM that began early July. Reports she has older shoes that she was using to workout and thinks that caused the problem. No recent imaging performed. Relevant PMH includes: migraines, thyroid disorder     Pain     Patient reports a current pain level of 0/10. Pain at best is reported as 0/10. Pain at worst is reported as 8/10.   Location: R heel  Pain Qualities: Aching, Tightness, Pulling, Sharp  Pain-Relieving Factors: Ice, Massage, Sitting  Pain-Aggravating Factors: Exercise, Running, Walking, Standing    More pain in the morning or once she stands after sitting a long time. Denies N/T          Objective   LEFS: 60  Posture                 Bilateral ankles/feet exhibit: Foot Hallux Valgus       Ankle/Foot  Palpation  Right Ankle/Foot Palpation  Abnormal: Muscle     TTP Medial>Lateral GS complex     Left Ankle/Foot Palpation  Abnormal: Muscle     TTP medial GS complex          Ankle/Foot Range of Motion   Right Ankle/Foot   Active (deg) Passive (deg) Pain   Dorsiflexion (KE)   5     Dorsiflexion (KF)         Plantar Flexion         Ankle Inversion         Ankle Eversion         Subtalar Inversion         Subtalar Eversion         Great Toe MTP Flexion         Great Toe MTP Extension   30     Great Toe IP Flexion           Left Ankle/Foot   Active (deg) Passive (deg) Pain   Dorsiflexion (KE)         Dorsiflexion (KF)   10     Plantar Flexion         Ankle Inversion         Ankle Eversion         Subtalar Inversion         Subtalar Eversion         Great Toe MTP Flexion         Great Toe MTP Extension   30     Great Toe IP Flexion           ROM WNL unless otherwise noted above.               Hip Strength - Planes of Motion   Right Strength Right Pain Left Strength Left Pain   Flexion (L2) 5   5     Extension 5   5     ABduction 5   5     ADduction 5   5     Internal Rotation 5   5     External Rotation 5   5       Knee Strength   Right Strength Right Pain Left Strength Left Pain   Flexion (S2) 5   5     Prone Flexion 5   5     Extension (L3) 5   5       Ankle/Foot Strength   Right Strength Right Pain Left Strength Left Pain   Dorsiflexion (L4) 4+   5     Plantar Flexion (S1) 4+   5     Inversion 4   5     Eversion 4   5     Great Toe Flexion           Great Toe Extension (L5)           Lesser Toes Flexion           Lesser Toes Extension                 Ankle/Foot Joint Mobility  Right Ankle/Foot Joint Mobility  Normal: Talocrural Joint and Subtalar Joint  Left Ankle/Foot Joint Mobility  Normal: Talocrural Joint and Subtalar Joint               Gait Analysis  Base of Support: Normal  Gait Pattern: Antalgic                     Activities   Therapeutic Exercise  Therapeutic Exercise Performed: Yes  Therapeutic Exercise  Activity 1: Toe yoga x10  Therapeutic Exercise Activity 2: towel scrunches x10  Therapeutic Exercise Activity 3: Gastroc stretch with strap x30sec  Therapeutic Exercise Activity 4: soleus stretch with strap x30sec  Therapeutic Exercise Activity 5: PF stretch with big toe extension x30sec       Therapeutic Activity  Therapeutic Activity Performed: Yes  Therapeutic Activity 1: Educated pt on anatomy of toe positioning and GS length in regards to current s/s. Discussed use of frozen water bottle/tennis ball to roll under PF as needed.                                               Assessment/Plan   Assessment  Sari presents with a condition of Low complexity.   Presentation of Symptoms: Stable            Patient Goal for Therapy (PT): Reduce pain- get back to exercising without pain.  Prognosis: Good  Assessment Details: Sari Spaulding is a 54 y.o.  female presenting with c/o R foot/ankle pain that is worse in the morning. Upon examination patient demonstrates decreased ankle strength, foot stability, decreased ankle ROM and big toe extension with + TrP and tenderness throughout GS complex. Functional limitations and participations restrictions include working out, running, STS, standing up, walking, and performing ADLs first thing in morning.  The clinical presentation of this patient is stable and their history and examination findings are consistent with a low complexity evaluation with good rehab potential. Pt will benefit from skilled PT intervention 1 x/week for 6 weeks to address limitations listed above and achieve desired goals.     Goals  Active       LTG - Patient will ambulate community distance without pain.        Start:  08/07/25    Expected End:  08/28/25            STG - Patient will ascend and descend a flight of stairs without HR assist and without pain.        Start:  08/07/25    Expected End:  09/18/25            Pt will demonstrate independence and report compliance with HEP to facilitate  independent rehab program upon discharge.       Start:  08/07/25    Expected End:  08/28/25            Pt to increase core/B LE strength in deficient muscles by >/= 1/2 MMT grade to improve dynamic stability during household/recreational/occupational tasks.       Start:  08/07/25    Expected End:  09/18/25            Pt will demonstrate improved ankle and big toe ROM to WFL to allow for ability to run without compensatory patterns and pain.        Start:  08/07/25    Expected End:  09/18/25             Education  Education was done with Patient. The patient's learning style includes Pictures/video and Demonstration.          Access Code: [email or web address] URL: [email or web address] Date: 08/07/2025 Prepared by: Saima Zamarripa Exercises - Toe Yoga - Alternating Great Toe and Lesser Toe Extension  - 1 x daily - 7 x weekly - 3 sets - 10 reps - Towel Scrunches  - 1 x daily - 7 x weekly - 3 sets - 10 reps - Long Sitting Calf Stretch with Strap  - 1 x daily - 7 x weekly - 1-2 sets - 30-60sec hold - Long Sitting Soleus Stretch on Bolster with Strap  - 1 x daily - 7 x weekly - 1-2 sets - 30-60sec hold - Seated Plantar Fascia Stretch  - 1 x daily - 7 x weekly - 1-2 sets - 30-60sec hold    Plan  From a physical therapy perspective, the patient would benefit from: Skilled Rehab Services    Planned therapy interventions include: Therapeutic exercise, Therapeutic activities, Neuromuscular re-education, Manual therapy, and Gait training.    Planned modalities to include: Biofeedback, Cryotherapy (cold pack), Thermotherapy (hot pack), Electrical stimulation - passive/unattended, Electrical stimulation - attended, and Other (Comment). Dry needling       Visit Frequency: 1 times Per Week for 6 Weeks.       This plan was discussed with Patient.   Discussion participants: Agreed Upon Plan of Care

## 2025-08-11 ENCOUNTER — APPOINTMENT (OUTPATIENT)
Dept: NEUROLOGY | Facility: CLINIC | Age: 55
End: 2025-08-11
Payer: COMMERCIAL

## 2025-08-11 VITALS
DIASTOLIC BLOOD PRESSURE: 80 MMHG | HEIGHT: 66 IN | HEART RATE: 77 BPM | WEIGHT: 157 LBS | BODY MASS INDEX: 25.23 KG/M2 | SYSTOLIC BLOOD PRESSURE: 122 MMHG

## 2025-08-11 DIAGNOSIS — G93.40 ENCEPHALOPATHY, UNSPECIFIED TYPE: ICD-10-CM

## 2025-08-11 DIAGNOSIS — F41.9 ANXIETY: ICD-10-CM

## 2025-08-11 DIAGNOSIS — G43.009 MIGRAINE WITHOUT AURA AND WITHOUT STATUS MIGRAINOSUS, NOT INTRACTABLE: Primary | ICD-10-CM

## 2025-08-11 PROCEDURE — 99214 OFFICE O/P EST MOD 30 MIN: CPT | Performed by: PSYCHIATRY & NEUROLOGY

## 2025-08-11 PROCEDURE — 3008F BODY MASS INDEX DOCD: CPT | Performed by: PSYCHIATRY & NEUROLOGY

## 2025-08-11 PROCEDURE — 1036F TOBACCO NON-USER: CPT | Performed by: PSYCHIATRY & NEUROLOGY

## 2025-08-11 ASSESSMENT — ENCOUNTER SYMPTOMS
NECK STIFFNESS: 0
COUGH: 0
PHOTOPHOBIA: 0
SINUS PRESSURE: 0
SLEEP DISTURBANCE: 0
HYPERACTIVE: 0
LIGHT-HEADEDNESS: 0
ABDOMINAL PAIN: 0
VOMITING: 0
NUMBNESS: 0
ADENOPATHY: 0
NECK PAIN: 0
SHORTNESS OF BREATH: 0
AGITATION: 0
ARTHRALGIAS: 0
UNEXPECTED WEIGHT CHANGE: 0
WEAKNESS: 0
FEVER: 0
HALLUCINATIONS: 0
BACK PAIN: 0
PALPITATIONS: 0
TREMORS: 0
SPEECH DIFFICULTY: 0
DIZZINESS: 0
JOINT SWELLING: 0
SEIZURES: 0
EYE PAIN: 0
NAUSEA: 0
DIFFICULTY URINATING: 0
FATIGUE: 0
BRUISES/BLEEDS EASILY: 0
WHEEZING: 0
FREQUENCY: 0
TROUBLE SWALLOWING: 0
HEADACHES: 1
FACIAL ASYMMETRY: 0
CONFUSION: 0
NERVOUS/ANXIOUS: 1

## 2025-08-18 ENCOUNTER — APPOINTMENT (OUTPATIENT)
Dept: PRIMARY CARE | Facility: CLINIC | Age: 55
End: 2025-08-18
Payer: COMMERCIAL

## 2025-08-18 ENCOUNTER — TREATMENT (OUTPATIENT)
Dept: PHYSICAL THERAPY | Facility: HOSPITAL | Age: 55
End: 2025-08-18
Payer: COMMERCIAL

## 2025-08-18 DIAGNOSIS — M79.671 RIGHT FOOT PAIN: Primary | ICD-10-CM

## 2025-08-18 PROCEDURE — 97110 THERAPEUTIC EXERCISES: CPT | Mod: GP,CQ

## 2025-08-18 PROCEDURE — 97140 MANUAL THERAPY 1/> REGIONS: CPT | Mod: GP,CQ

## 2025-08-25 ENCOUNTER — APPOINTMENT (OUTPATIENT)
Dept: PHYSICAL THERAPY | Facility: HOSPITAL | Age: 55
End: 2025-08-25
Payer: COMMERCIAL

## 2025-08-25 DIAGNOSIS — M79.671 RIGHT FOOT PAIN: Primary | ICD-10-CM

## 2025-09-02 ENCOUNTER — TREATMENT (OUTPATIENT)
Dept: PHYSICAL THERAPY | Facility: HOSPITAL | Age: 55
End: 2025-09-02
Payer: COMMERCIAL

## 2025-09-02 DIAGNOSIS — M79.671 RIGHT FOOT PAIN: Primary | ICD-10-CM

## 2025-09-02 PROCEDURE — 97110 THERAPEUTIC EXERCISES: CPT | Mod: GP

## 2025-09-02 PROCEDURE — 97140 MANUAL THERAPY 1/> REGIONS: CPT | Mod: GP

## 2026-02-02 ENCOUNTER — APPOINTMENT (OUTPATIENT)
Dept: NEUROLOGY | Facility: CLINIC | Age: 56
End: 2026-02-02
Payer: COMMERCIAL

## 2026-04-14 ENCOUNTER — APPOINTMENT (OUTPATIENT)
Dept: PRIMARY CARE | Facility: CLINIC | Age: 56
End: 2026-04-14
Payer: COMMERCIAL